# Patient Record
Sex: FEMALE | Race: BLACK OR AFRICAN AMERICAN | NOT HISPANIC OR LATINO
[De-identification: names, ages, dates, MRNs, and addresses within clinical notes are randomized per-mention and may not be internally consistent; named-entity substitution may affect disease eponyms.]

---

## 2021-02-22 PROBLEM — Z00.00 ENCOUNTER FOR PREVENTIVE HEALTH EXAMINATION: Status: ACTIVE | Noted: 2021-02-22

## 2021-02-23 ENCOUNTER — APPOINTMENT (OUTPATIENT)
Dept: OBGYN | Facility: CLINIC | Age: 26
End: 2021-02-23
Payer: COMMERCIAL

## 2021-02-23 VITALS
HEIGHT: 69 IN | DIASTOLIC BLOOD PRESSURE: 70 MMHG | TEMPERATURE: 98.2 F | HEART RATE: 67 BPM | BODY MASS INDEX: 24.44 KG/M2 | SYSTOLIC BLOOD PRESSURE: 131 MMHG | WEIGHT: 165 LBS

## 2021-02-23 DIAGNOSIS — Z78.9 OTHER SPECIFIED HEALTH STATUS: ICD-10-CM

## 2021-02-23 DIAGNOSIS — O36.80X1 PREGNANCY WITH INCONCLUSIVE FETAL VIABILITY, FETUS 1: ICD-10-CM

## 2021-02-23 DIAGNOSIS — Z83.49 FAMILY HISTORY OF OTHER ENDOCRINE, NUTRITIONAL AND METABOLIC DISEASES: ICD-10-CM

## 2021-02-23 DIAGNOSIS — Z82.49 FAMILY HISTORY OF ISCHEMIC HEART DISEASE AND OTHER DISEASES OF THE CIRCULATORY SYSTEM: ICD-10-CM

## 2021-02-23 PROCEDURE — 99202 OFFICE O/P NEW SF 15 MIN: CPT

## 2021-02-23 PROCEDURE — 99072 ADDL SUPL MATRL&STAF TM PHE: CPT

## 2021-02-24 ENCOUNTER — NON-APPOINTMENT (OUTPATIENT)
Age: 26
End: 2021-02-24

## 2021-02-24 ENCOUNTER — EMERGENCY (EMERGENCY)
Facility: HOSPITAL | Age: 26
LOS: 1 days | Discharge: HOME | End: 2021-02-24
Admitting: EMERGENCY MEDICINE
Payer: COMMERCIAL

## 2021-02-24 VITALS
HEART RATE: 69 BPM | SYSTOLIC BLOOD PRESSURE: 135 MMHG | DIASTOLIC BLOOD PRESSURE: 76 MMHG | TEMPERATURE: 98 F | OXYGEN SATURATION: 100 % | RESPIRATION RATE: 16 BRPM

## 2021-02-24 VITALS
HEART RATE: 64 BPM | RESPIRATION RATE: 18 BRPM | DIASTOLIC BLOOD PRESSURE: 80 MMHG | SYSTOLIC BLOOD PRESSURE: 123 MMHG | OXYGEN SATURATION: 100 % | TEMPERATURE: 97 F

## 2021-02-24 LAB
ALBUMIN SERPL ELPH-MCNC: 4.5 G/DL — SIGNIFICANT CHANGE UP (ref 3.3–5)
ALP SERPL-CCNC: 40 U/L — SIGNIFICANT CHANGE UP (ref 40–120)
ALT FLD-CCNC: 16 U/L — SIGNIFICANT CHANGE UP (ref 4–33)
ANION GAP SERPL CALC-SCNC: 13 MMOL/L — SIGNIFICANT CHANGE UP (ref 7–14)
APPEARANCE UR: CLEAR — SIGNIFICANT CHANGE UP
AST SERPL-CCNC: 16 U/L — SIGNIFICANT CHANGE UP (ref 4–32)
BASOPHILS # BLD AUTO: 0.06 K/UL — SIGNIFICANT CHANGE UP (ref 0–0.2)
BASOPHILS NFR BLD AUTO: 1 % — SIGNIFICANT CHANGE UP (ref 0–2)
BILIRUB SERPL-MCNC: <0.2 MG/DL — SIGNIFICANT CHANGE UP (ref 0.2–1.2)
BILIRUB UR-MCNC: NEGATIVE — SIGNIFICANT CHANGE UP
BUN SERPL-MCNC: 12 MG/DL — SIGNIFICANT CHANGE UP (ref 7–23)
CALCIUM SERPL-MCNC: 9.9 MG/DL — SIGNIFICANT CHANGE UP (ref 8.4–10.5)
CHLORIDE SERPL-SCNC: 99 MMOL/L — SIGNIFICANT CHANGE UP (ref 98–107)
CO2 SERPL-SCNC: 24 MMOL/L — SIGNIFICANT CHANGE UP (ref 22–31)
COLOR SPEC: SIGNIFICANT CHANGE UP
CREAT SERPL-MCNC: 0.7 MG/DL — SIGNIFICANT CHANGE UP (ref 0.5–1.3)
DIFF PNL FLD: ABNORMAL
EOSINOPHIL # BLD AUTO: 0.04 K/UL — SIGNIFICANT CHANGE UP (ref 0–0.5)
EOSINOPHIL NFR BLD AUTO: 0.6 % — SIGNIFICANT CHANGE UP (ref 0–6)
GLUCOSE SERPL-MCNC: 76 MG/DL — SIGNIFICANT CHANGE UP (ref 70–99)
GLUCOSE UR QL: NEGATIVE — SIGNIFICANT CHANGE UP
HCT VFR BLD CALC: 38.8 % — SIGNIFICANT CHANGE UP (ref 34.5–45)
HGB BLD-MCNC: 11.9 G/DL — SIGNIFICANT CHANGE UP (ref 11.5–15.5)
IANC: 3.08 K/UL — SIGNIFICANT CHANGE UP (ref 1.5–8.5)
IMM GRANULOCYTES NFR BLD AUTO: 0.3 % — SIGNIFICANT CHANGE UP (ref 0–1.5)
INR BLD: 1.06 RATIO — SIGNIFICANT CHANGE UP (ref 0.88–1.16)
KETONES UR-MCNC: ABNORMAL
LEUKOCYTE ESTERASE UR-ACNC: NEGATIVE — SIGNIFICANT CHANGE UP
LYMPHOCYTES # BLD AUTO: 2.68 K/UL — SIGNIFICANT CHANGE UP (ref 1–3.3)
LYMPHOCYTES # BLD AUTO: 42.6 % — SIGNIFICANT CHANGE UP (ref 13–44)
MCHC RBC-ENTMCNC: 24.9 PG — LOW (ref 27–34)
MCHC RBC-ENTMCNC: 30.7 GM/DL — LOW (ref 32–36)
MCV RBC AUTO: 81.3 FL — SIGNIFICANT CHANGE UP (ref 80–100)
MONOCYTES # BLD AUTO: 0.41 K/UL — SIGNIFICANT CHANGE UP (ref 0–0.9)
MONOCYTES NFR BLD AUTO: 6.5 % — SIGNIFICANT CHANGE UP (ref 2–14)
NEUTROPHILS # BLD AUTO: 3.08 K/UL — SIGNIFICANT CHANGE UP (ref 1.8–7.4)
NEUTROPHILS NFR BLD AUTO: 49 % — SIGNIFICANT CHANGE UP (ref 43–77)
NITRITE UR-MCNC: NEGATIVE — SIGNIFICANT CHANGE UP
NRBC # BLD: 0 /100 WBCS — SIGNIFICANT CHANGE UP
NRBC # FLD: 0 K/UL — SIGNIFICANT CHANGE UP
PH UR: 6.5 — SIGNIFICANT CHANGE UP (ref 5–8)
PLATELET # BLD AUTO: 380 K/UL — SIGNIFICANT CHANGE UP (ref 150–400)
POTASSIUM SERPL-MCNC: 3.7 MMOL/L — SIGNIFICANT CHANGE UP (ref 3.5–5.3)
POTASSIUM SERPL-SCNC: 3.7 MMOL/L — SIGNIFICANT CHANGE UP (ref 3.5–5.3)
PROT SERPL-MCNC: 7.8 G/DL — SIGNIFICANT CHANGE UP (ref 6–8.3)
PROT UR-MCNC: ABNORMAL
PROTHROM AB SERPL-ACNC: 12.2 SEC — SIGNIFICANT CHANGE UP (ref 10.6–13.6)
RBC # BLD: 4.77 M/UL — SIGNIFICANT CHANGE UP (ref 3.8–5.2)
RBC # FLD: 16.4 % — HIGH (ref 10.3–14.5)
SODIUM SERPL-SCNC: 136 MMOL/L — SIGNIFICANT CHANGE UP (ref 135–145)
SP GR SPEC: 1.03 — HIGH (ref 1.01–1.02)
UROBILINOGEN FLD QL: SIGNIFICANT CHANGE UP
WBC # BLD: 6.29 K/UL — SIGNIFICANT CHANGE UP (ref 3.8–10.5)
WBC # FLD AUTO: 6.29 K/UL — SIGNIFICANT CHANGE UP (ref 3.8–10.5)

## 2021-02-24 PROCEDURE — 99244 OFF/OP CNSLTJ NEW/EST MOD 40: CPT | Mod: GC

## 2021-02-24 PROCEDURE — 76830 TRANSVAGINAL US NON-OB: CPT | Mod: 26

## 2021-02-24 PROCEDURE — 99285 EMERGENCY DEPT VISIT HI MDM: CPT

## 2021-02-24 NOTE — ED PROVIDER NOTE - PROGRESS NOTE DETAILS
MATY Leslie Addendum-----This patient was signed out to me by MATY Munoz; pt was pending labs/US/Gyn evaluation.  In the interim, pt objectively noted to be resting comfortably; no issues thus far; pt HD stable.  Labs/US reviewed and pt was evaluated by Ob/Gyn team; assessment: likely miscarriage, Gyn team discussed options with pt; pt stated preference to be discharged home, monitor symptoms, and f/u outpatient with her Ob/Gyn.  Ob/Gyn resident states pt is able to be discharged home.

## 2021-02-24 NOTE — ED PROVIDER NOTE - NSFOLLOWUPINSTRUCTIONS_ED_ALL_ED_FT
Follow up with your Ob/Gyn doctor in 2 - 3 days.  Notify your primary medical doctor of your ER visit; follow up accordingly.    If you need to find a doctor to follow up with, you may call the [facility] Patient Access Services helpline at [phone] to find names/contact #s for a practitioner (or specialist) to follow up with.    Bring your discharge papers / test results with you to your follow up appointment(s).    Rest / no strenuous activity.  Stay well hydrated.    ***Return to the Emergency Department immediately if you experience any new / worsening symptoms or have any problems / concerns.***

## 2021-02-24 NOTE — ED ADULT NURSE NOTE - OBJECTIVE STATEMENT
patient aaox3. ambulatory w/o assist. came in with vaginal bleeding. patient is 10 weeks pregnant. confirmed iup by her gyn. last visit yesterday 2/23 verified fluid around gestational sac. patient has been spotting and having light flow all day. changed 1 pad so far. hx of chlamydia. denies urinary sx, fevers, chills, abdominal pain. iv started to left ac 20g. labs drawn and sent. urine collected and sent. ousmane tom at bedside for vaginal exam. awaiting tvus. Will continue to monitor

## 2021-02-24 NOTE — ED PROVIDER NOTE - PATIENT PORTAL LINK FT
You can access the FollowMyHealth Patient Portal offered by [facility] by registering at the following website: [email or web address]. By joining ""’s FollowMyHealth portal, you will also be able to view your health information using other applications (apps) compatible with our system.

## 2021-02-24 NOTE — ED ADULT TRIAGE NOTE - CHIEF COMPLAINT QUOTE
Pt states that she has been having painless vaginal bleeding, was seen by OB yesterday and had +IUP  LMP 12/2/20

## 2021-02-24 NOTE — ED PROVIDER NOTE - CHPI ED SYMPTOMS NEG
No weakness, dizziness, or urinary frequency../no dysuria/no fever/no chills no dysuria/no fever/no chills

## 2021-02-24 NOTE — ED PROVIDER NOTE - CARE PLAN
Principal Discharge DX:	Vaginal bleeding in pregnancy, first trimester  Secondary Diagnosis:	Miscarriage

## 2021-02-24 NOTE — CONSULT NOTE ADULT - SUBJECTIVE AND OBJECTIVE BOX
NIMA BRYAN  25y  Female [id number]    HPI:        Name of GYN Physician:     POB:      Pgyn: Denies fibroids, cysts, endometriosis, STI's, Abnormal pap smears     Home meds:     Hospital Meds:   MEDICATIONS  (STANDING):    MEDICATIONS  (PRN):      Allergies    No Known Allergies    Intolerances        PAST MEDICAL & SURGICAL HISTORY:  Syncope  past episode of syncope 4 yrs ago    No significant past surgical history        FAMILY HISTORY:      Social History:  Denies smoking use, drug use, alcohol use.   +occasional social alcohol use    Vital Signs Last 24 Hrs  T(C): 36.1 (24 Feb 2021 21:07), Max: 36.9 (24 Feb 2021 19:12)  T(F): 96.9 (24 Feb 2021 21:07), Max: 98.5 (24 Feb 2021 19:12)  HR: 64 (24 Feb 2021 21:07) (64 - 75)  BP: 123/80 (24 Feb 2021 21:07) (119/71 - 135/76)  BP(mean): --  RR: 18 (24 Feb 2021 21:07) (16 - 18)  SpO2: 100% (24 Feb 2021 21:07) (100% - 100%)    Physical Exam:   General: sitting comftorably in bed, NAD   HEENT: neck supple, full ROM  CV: RR S1S2 no m/r/g  Lungs: CTA b/l, good air flow b/l   Back: No CVA tenderness  Abd: Soft, non-tender, non-distended.  Bowel sounds present.    :  No bleeding on pad.    External labia wnl.  Bimanual exam with no cervical motion tenderness, uterus wnl, adnexa non palpable b/l.  Cervix closed vs. Cervix dilated    cm   Speculum Exam: No active bleeding from os.  Physiologic discharge.    Ext: non-tender b/l, no edema     LABS:                              11.9   6.29  )-----------( 380      ( 24 Feb 2021 23:22 )             38.8           I&O's Detail    PT/INR - ( 24 Feb 2021 23:22 )   PT: 12.2 sec;   INR: 1.06 ratio               RADIOLOGY & ADDITIONAL STUDIES: NIMA BRYAN  25y  Female [id number]    HPI: 26yo  LMP 21 at 12w1d presenting for vaginal bleeding at 3:30pm today.  States went through one maxi pad, but bleeding has since stopped.  Denies any cramping, any abd/pelvic pain.    Denies fevers, chills, nausea, vomiting, SOB, CP, urinary symptoms, cough, sick contacts.    Unplanned but desired pregnancy.    PNC: pt states was initially seeing OB in [place], had an ultrasound at [place] [facility] that showed a gestational sac, yolk sac, IUP but no FHR.  Transfered OB care to Dr. Hobbs yesterday, had US that again showed IUP but no FHR.  Was told gestational sac looked like it was collapsing.    OBHx: current    GynHx: denies fibroids, cysts, abnl pap smears, STIs, hx of irregular, heavy menses    PMSH: denies    Social History:  Denies smoking use, drug use, alcohol use.   +occasional social alcohol use    Meds: denies    Allergies: NKDA    Vital Signs Last 24 Hrs  T(C): 36.1 (2021 21:07), Max: 36.9 (2021 19:12)  T(F): 96.9 (2021 21:07), Max: 98.5 (2021 19:12)  HR: 64 (2021 21:07) (64 - 75)  BP: 123/80 (2021 21:07) (119/71 - 135/76)  RR: 18 (2021 21:07) (16 - 18)  SpO2: 100% (2021 21:07) (100% - 100%)    Physical Exam:   General: NAD, pt appears tearful  HEENT: neck supple, full ROM  CV: RR S1S2 no m/r/g  Lungs: CTA b/l, good air flow b/l   Back: No CVA tenderness  Abd: Soft, non-tender, non-distended.  Bowel sounds present.    :  No bleeding on pad.    External labia wnl.  Bimanual exam with no cervical motion tenderness, uterus wnl, adnexa non palpable b/l.  Cervix external os open to 0.5cm.  Speculum Exam: No active bleeding from os.  scant dark blood in vaginal walls, mucous from cervix, cervix appears closed  Ext: non-tender b/l, no edema     LABS:                              11.9   6.29  )-----------( 380      ( 2021 23:22 )             38.8           I&O's Detail    PT/INR - ( 2021 23:22 )   PT: 12.2 sec;   INR: 1.06 ratio       HCG Quantitative, Serum: 11593.0: For pregnancy evaluation the reference values are as follows:  Negative: <5 mIU/mL  Indeterminate: 5-25 mIU/mL (suggest repeat testing in 72hrs)  Positive: >25 mIU/mL  Note: hCG results of false positive and false negative for pregnancy are  rare, but can occur with this, and other hCG tests. Rhona- and  post-menopausal females may have mildly elevated hCG concentrations,  usually less than 14 mIU/mL, that are constant over time.  Weeks of pregnancy:           mIU/mL  ------------------         -------          3                                6 -      71          4                              10 -     750          5                             220 -   7,100          6                             160 -  32,000          7                3,700 - 164,000          8                          32,000 - 150,000          9                          64,000 - 151,000         10                         47,000 - 187,000         12                         28,000 - 211,000         14                         14,000 -  63,000         15                         12,000 -  71,000         16 - 18                   8,000 -  58,000 mIU/mL (21 @ 23:22)        RADIOLOGY & ADDITIONAL STUDIES:  < from: US Transvaginal (21 @ 23:38) >    EXAM:  US TRANSVAGINAL        PROCEDURE DATE:  2021         INTERPRETATION:  CLINICAL INFORMATION: Vaginal bleeding. Pregnant.    LMP: 2021    COMPARISON: None available.    TECHNIQUE:  Endovaginal pelvic sonogram only. Color and Spectral Doppler was performed.    FINDINGS:    Uterus: 8.8 cm x 5.4 cm x 7.6 cm. Trace fluid in the cervix.  Endometrium: Thickened and heterogeneous cystic endometrium measuring 2.0 cm with internal vascularity.    Right ovary: 3.5 cm x 1.7 cm x 3.0 cm. Corpus luteum noted. Normal arterial waveform.  Left ovary: 3.2 cm x 1.2 cm x 2.8 cm. Within normal limits. Arterial waveforms noted.    Fluid: None.    IMPRESSION:  Positive beta hCG without intrauterine gestational sac or suspicious adnexal mass, consistent with a pregnancy of unknown location. Differential diagnosis includes early intrauterine pregnancy, ectopic pregnancy and failed pregnancy. Close follow-up with ultrasound and beta hCG levels is necessary to exclude ectopic pregnancy.    The findings were discussed with MATY MCDONOUGH on 2021 1:32 AM.  Hospital policies for call back including read back policies were followed. The verbal communication call back supplements this written report.            SABRINA GUTIERREZ MD; Resident Radiology  This document has been electronically signed.  BAUTISTA FERRO MD; Attending Radiologist  This document has been electronically signed. 2021  1:32AM    < end of copied text >

## 2021-02-24 NOTE — ED PROVIDER NOTE - CLINICAL SUMMARY MEDICAL DECISION MAKING FREE TEXT BOX
26 y/o female patient well appearing and in NAD. Will check labs, U/S, and f/u GYN. 26 y/o female with no significant PMHx currently 10 weeks pregnant presents to the ER c/o 1 day of vaginal spotting. Pt states she noticed bleeding after intercourse today.  Pt is well appearing, NAD, will check labs, U/S, and f/u GYN.

## 2021-02-24 NOTE — ED PROVIDER NOTE - OBJECTIVE STATEMENT
26 y/o G 1/ P 0 female currently x 10 weeks pregnant LMP 12/2 with no pertinent PMHx presents to the ED with c/o x 1 day of vaginal spotting. Pt states she noticed bleeding after intercourse. Pt notes she is followed OB/GYN Dr. Hobbs; saw her GYN yesterday and has not seen a heart beat. Pt denies any weakness, dizziness, pain, fever, chills, dysuria, or urinary frequency. 24 y/o female with no significant PMHx currently 10 weeks pregnant presents to the ER c/o 1 day of vaginal spotting. Pt states she noticed bleeding after intercourse today. Pt is followed by OB/GYN Dr. Hobbs; pt saw her GYN yesterday and had a US. Pt denies weakness, dizziness, pain, fever, chills, dysuria, or urinary frequency.  , LMP 12/2.

## 2021-02-24 NOTE — CONSULT NOTE ADULT - ASSESSMENT
A/P: 24yo  at 12w1d by LMP presenting w vaginal bleeding, w spontaneous AB  Pt counseled about diagnosis of SAB.  No pregnancy identified intrauterine.  Pt clinically stable, w minimal bleeding noted, non tender on exam.  Pt given options including expectant mgmt, medical mgmt, and surgical mgmt.  Pt counseled on options of all three, potential need for surgical mgmt if bleeding becomes significant, or signs of infection, and potential complications.  R/b/a discussed.  Pt verbalizes understanding, all questions answered to pt apparent satisfaction.  Pt states she would like to go home, declines med and surg mgmt at this time.    Pt will have f/u w Dr. Hobbs for TVUS and office visit on Mon/Tu.  Pt given return precautions for bleeding, pain, infection.    RH pos.    D/w Dr. Mack Cruz PGY4

## 2021-02-25 LAB
BLD GP AB SCN SERPL QL: NEGATIVE — SIGNIFICANT CHANGE UP
CULTURE RESULTS: SIGNIFICANT CHANGE UP
HCG SERPL-ACNC: SIGNIFICANT CHANGE UP MIU/ML
RH IG SCN BLD-IMP: POSITIVE — SIGNIFICANT CHANGE UP
SPECIMEN SOURCE: SIGNIFICANT CHANGE UP

## 2021-02-28 PROBLEM — Z78.9 NO PERTINENT PAST SURGICAL HISTORY: Status: RESOLVED | Noted: 2021-02-28 | Resolved: 2021-02-28

## 2021-02-28 PROBLEM — Z78.9 DOES NOT USE ILLICIT DRUGS: Status: ACTIVE | Noted: 2021-02-28

## 2021-02-28 PROBLEM — Z78.9 NEVER SMOKED TOBACCO: Status: ACTIVE | Noted: 2021-02-28

## 2021-02-28 PROBLEM — Z82.49 FAMILY HISTORY OF ESSENTIAL HYPERTENSION: Status: ACTIVE | Noted: 2021-02-28

## 2021-02-28 PROBLEM — Z78.9 DENIES ALCOHOL CONSUMPTION: Status: ACTIVE | Noted: 2021-02-28

## 2021-02-28 PROBLEM — Z83.49 FAMILY HISTORY OF GRAVES' DISEASE: Status: ACTIVE | Noted: 2021-02-28

## 2021-02-28 LAB
ABO + RH PNL BLD: NORMAL
BACTERIA UR CULT: NORMAL
BLD GP AB SCN SERPL QL: NORMAL
C TRACH RRNA SPEC QL NAA+PROBE: NOT DETECTED
HBV SURFACE AG SER QL: NONREACTIVE
HCV AB SER QL: NONREACTIVE
HCV S/CO RATIO: 0.12 S/CO
HIV1+2 AB SPEC QL IA.RAPID: NONREACTIVE
N GONORRHOEA RRNA SPEC QL NAA+PROBE: NOT DETECTED
PROGEST SERPL-MCNC: 7.2 NG/ML
SOURCE AMPLIFICATION: NORMAL
T PALLIDUM AB SER QL IA: NEGATIVE

## 2021-02-28 RX ORDER — PRENATAL VIT 49/IRON FUM/FOLIC 6.75-0.2MG
TABLET ORAL
Refills: 0 | Status: ACTIVE | COMMUNITY

## 2021-03-02 ENCOUNTER — ASOB RESULT (OUTPATIENT)
Age: 26
End: 2021-03-02

## 2021-03-02 ENCOUNTER — APPOINTMENT (OUTPATIENT)
Dept: OBGYN | Facility: CLINIC | Age: 26
End: 2021-03-02
Payer: COMMERCIAL

## 2021-03-02 PROCEDURE — 99072 ADDL SUPL MATRL&STAF TM PHE: CPT

## 2021-03-02 PROCEDURE — 76817 TRANSVAGINAL US OBSTETRIC: CPT

## 2021-03-08 ENCOUNTER — EMERGENCY (EMERGENCY)
Facility: HOSPITAL | Age: 26
LOS: 1 days | Discharge: HOME | End: 2021-03-08
Attending: EMERGENCY MEDICINE | Admitting: EMERGENCY MEDICINE
Payer: COMMERCIAL

## 2021-03-08 VITALS
OXYGEN SATURATION: 99 % | TEMPERATURE: 98 F | RESPIRATION RATE: 17 BRPM | HEART RATE: 80 BPM | SYSTOLIC BLOOD PRESSURE: 121 MMHG | DIASTOLIC BLOOD PRESSURE: 60 MMHG

## 2021-03-08 PROCEDURE — 99285 EMERGENCY DEPT VISIT HI MDM: CPT

## 2021-03-08 NOTE — ED ADULT TRIAGE NOTE - CHIEF COMPLAINT QUOTE
Pt had a miscarriage 2 weeks, pt was 10 weeks pregnant. Pt reports heavy bleeding beginning around 5pm today, went through 4 pads. Pt also c/o pelvic cramping, nausea, vomiting, weakness, dizziness all beginning today. denies any PMH.

## 2021-03-09 VITALS
HEART RATE: 83 BPM | OXYGEN SATURATION: 99 % | RESPIRATION RATE: 16 BRPM | SYSTOLIC BLOOD PRESSURE: 122 MMHG | TEMPERATURE: 98 F | DIASTOLIC BLOOD PRESSURE: 72 MMHG

## 2021-03-09 LAB
ALBUMIN SERPL ELPH-MCNC: 4.2 G/DL — SIGNIFICANT CHANGE UP (ref 3.3–5)
ALP SERPL-CCNC: 37 U/L — LOW (ref 40–120)
ALT FLD-CCNC: 15 U/L — SIGNIFICANT CHANGE UP (ref 4–33)
ANION GAP SERPL CALC-SCNC: 10 MMOL/L — SIGNIFICANT CHANGE UP (ref 7–14)
APPEARANCE UR: CLEAR — SIGNIFICANT CHANGE UP
AST SERPL-CCNC: 15 U/L — SIGNIFICANT CHANGE UP (ref 4–32)
BACTERIA # UR AUTO: ABNORMAL
BILIRUB SERPL-MCNC: <0.2 MG/DL — SIGNIFICANT CHANGE UP (ref 0.2–1.2)
BILIRUB UR-MCNC: NEGATIVE — SIGNIFICANT CHANGE UP
BLD GP AB SCN SERPL QL: NEGATIVE — SIGNIFICANT CHANGE UP
BUN SERPL-MCNC: 14 MG/DL — SIGNIFICANT CHANGE UP (ref 7–23)
CALCIUM SERPL-MCNC: 9.6 MG/DL — SIGNIFICANT CHANGE UP (ref 8.4–10.5)
CHLORIDE SERPL-SCNC: 101 MMOL/L — SIGNIFICANT CHANGE UP (ref 98–107)
CO2 SERPL-SCNC: 24 MMOL/L — SIGNIFICANT CHANGE UP (ref 22–31)
COLOR SPEC: YELLOW — SIGNIFICANT CHANGE UP
CREAT SERPL-MCNC: 0.7 MG/DL — SIGNIFICANT CHANGE UP (ref 0.5–1.3)
DIFF PNL FLD: ABNORMAL
EPI CELLS # UR: 2 /HPF — SIGNIFICANT CHANGE UP (ref 0–5)
GLUCOSE SERPL-MCNC: 123 MG/DL — HIGH (ref 70–99)
GLUCOSE UR QL: NEGATIVE — SIGNIFICANT CHANGE UP
HCG SERPL-ACNC: 8789 MIU/ML — SIGNIFICANT CHANGE UP
HCT VFR BLD CALC: 33.7 % — LOW (ref 34.5–45)
HGB BLD-MCNC: 10.4 G/DL — LOW (ref 11.5–15.5)
HYALINE CASTS # UR AUTO: 0 /LPF — SIGNIFICANT CHANGE UP (ref 0–7)
KETONES UR-MCNC: ABNORMAL
LEUKOCYTE ESTERASE UR-ACNC: NEGATIVE — SIGNIFICANT CHANGE UP
MCHC RBC-ENTMCNC: 25.4 PG — LOW (ref 27–34)
MCHC RBC-ENTMCNC: 30.9 GM/DL — LOW (ref 32–36)
MCV RBC AUTO: 82.2 FL — SIGNIFICANT CHANGE UP (ref 80–100)
NITRITE UR-MCNC: NEGATIVE — SIGNIFICANT CHANGE UP
NRBC # BLD: 0 /100 WBCS — SIGNIFICANT CHANGE UP
NRBC # FLD: 0 K/UL — SIGNIFICANT CHANGE UP
PH UR: 6 — SIGNIFICANT CHANGE UP (ref 5–8)
PLATELET # BLD AUTO: 343 K/UL — SIGNIFICANT CHANGE UP (ref 150–400)
POTASSIUM SERPL-MCNC: 3.9 MMOL/L — SIGNIFICANT CHANGE UP (ref 3.5–5.3)
POTASSIUM SERPL-SCNC: 3.9 MMOL/L — SIGNIFICANT CHANGE UP (ref 3.5–5.3)
PROT SERPL-MCNC: 7.2 G/DL — SIGNIFICANT CHANGE UP (ref 6–8.3)
PROT UR-MCNC: ABNORMAL
RBC # BLD: 4.1 M/UL — SIGNIFICANT CHANGE UP (ref 3.8–5.2)
RBC # FLD: 15.9 % — HIGH (ref 10.3–14.5)
RBC CASTS # UR COMP ASSIST: 46 /HPF — HIGH (ref 0–4)
RH IG SCN BLD-IMP: POSITIVE — SIGNIFICANT CHANGE UP
SODIUM SERPL-SCNC: 135 MMOL/L — SIGNIFICANT CHANGE UP (ref 135–145)
SP GR SPEC: 1.03 — HIGH (ref 1.01–1.02)
UROBILINOGEN FLD QL: SIGNIFICANT CHANGE UP
WBC # BLD: 11.09 K/UL — HIGH (ref 3.8–10.5)
WBC # FLD AUTO: 11.09 K/UL — HIGH (ref 3.8–10.5)
WBC UR QL: 3 /HPF — SIGNIFICANT CHANGE UP (ref 0–5)

## 2021-03-09 PROCEDURE — 76830 TRANSVAGINAL US NON-OB: CPT | Mod: 26

## 2021-03-09 NOTE — ED PROVIDER NOTE - NSFOLLOWUPINSTRUCTIONS_ED_ALL_ED_FT
Your diagnosis: retained products of conception    We performed labs and an ultrasound of your pelvis showing retained products of conception. You were seen by our OB/GYN team who cleared you for discharge. You were given 1 dose of a medication called cytotec here in the ED.    Discharge instructions:    - Please follow up with your OB/GYN doctor in 1 week.    - Tylenol up to 650 mg every 8 hours as needed for pain and/or Motrin up to 600 mg every 6 hours as needed for pain.    - Be sure to return to the ED if you develop new or worsening symptoms. Specific signs and symptoms to be vigilant of: worsening or persistent vaginal bleeding, excessive blood loss, symptoms of anemia (dizziness, shortness of breath, decreased exercise tolerance), severe abdominal or pelvic pain.

## 2021-03-09 NOTE — ED PROVIDER NOTE - OBJECTIVE STATEMENT
26 y/o female G 1/P 0 approximately x 10 weeks pregnant with no pertinent PMHx with a miscarriage x 2 weeks ago. Pt followed up x 1 week later and was told “a lot of fluid still in uterus” and hormone level was still high; Pt's GYN Dr. Hobbs. Pt notes vaginal bleeding had stopped, but returned today at 5 pm; x 1 pad a hour. Pt endorses abdominal cramping that radiates to the back, and lightheadedness and weakness that started after the bleeding. Pt denies any fever, chills, or N/V/D. NKDA

## 2021-03-09 NOTE — ED PROVIDER NOTE - PROGRESS NOTE DETAILS
Juan: Gyn consulted for U/S suggestive of retained products. Ton mg cytotec buccally recommended by GYN, who cleared the patient for discharge with outpatient follow up with her gynecologist in 1 week. Safe for discharge.

## 2021-03-09 NOTE — CONSULT NOTE ADULT - SUBJECTIVE AND OBJECTIVE BOX
NIMA BRYAN  25y  Female [id number]    HPI:26 yo , LMP 12/, presenting with vaginal bleeding. Pt has a known miscarriage which occured in february. She presented to ED at that time and was diagnosed with SAB and discharged w/ outpatient follow-up. Pt states bleeding improved however last night around 5pm she soaked through 5 pads in 5 hours with associated dizziness/lightheadedness. She currently reports symptoms have improved. Reports bleeding is minimal and she is asymptomatic. Patient denies chest pain, shortness of breath, fevers, chills, nausea, vomiting, diarrhea.       Ob/Gyn Physician: [facility] (Dr. Hobbs)    Obhx: current  GynHx: Denies fibroids, cysts, abnormal pap smears, STIs  PMH: denies  PSH: denies  Allergies: NKDA    Vital Signs Last 24 Hrs  T(C): 36.9 (09 Mar 2021 03:23), Max: 36.9 (09 Mar 2021 03:23)  T(F): 98.5 (09 Mar 2021 03:23), Max: 98.5 (09 Mar 2021 03:23)  HR: 83 (09 Mar 2021 03:23) (80 - 83)  BP: 122/72 (09 Mar 2021 03:23) (121/60 - 122/72)  BP(mean): --  RR: 16 (09 Mar 2021 03:23) (16 - 17)  SpO2: 99% (09 Mar 2021 03:23) (99% - 99%)    Physical Exam:   General: NAD   CV: RR   Lungs: unlabored on RA  Abd: Soft, non-tender, non-distended  :  minimal bleeding on pad.  External labia wnl.   Speculum Exam: No active bleeding from os. cervix appears dilated  Bimanual exam: -CMT, uterus appropriately sized, nontender, adnexa non-enlarged, cervix dilated 1cm  Ext: non-tender b/l, no edema     LABS:                              10.4   11.09 )-----------( 343      ( 08 Mar 2021 23:59 )             33.7     03-08    135  |  101  |  14  ----------------------------<  123<H>  3.9   |  24  |  0.70    Ca    9.6      08 Mar 2021 23:59    TPro  7.2  /  Alb  4.2  /  TBili  <0.2  /  DBili  x   /  AST  15  /  ALT  15  /  AlkPhos  37<L>  -    I&O's Detail          RADIOLOGY & ADDITIONAL STUDIES:    < from: US Transvaginal (21 @ 02:23) >    EXAM:  US TRANSVAGINAL        PROCEDURE DATE:  Mar  9 2021         INTERPRETATION:  CLINICAL INFORMATION: Evaluate for retained products. Patient status post  2 weeks ago. Resenting with heavy bleeding.    LMP: 2020    COMPARISON: Pelvic ultrasound 2021    TECHNIQUE: Endovaginal pelvic sonogram only. Color and Spectral Doppler was performed.    FINDINGS:    Uterus: 10.1 cm x 5.9 cm x 6.2 cm. Within normal limits. The cervix is closed.  Endometrium: The endometrium is heterogeneously thickened at the lower uterine segment measuring up to 1.7 cm. There is trace vascularity within this area of thickening.    Right ovary: 3.5 cm x 1.5 cm x 1.5 cm. corpus luteal cyst measures 1.4 cm. Normal arterial and venous waveforms.  Leftovary: 3.2 cm x 2.4 cm x 1.7 cm. Within normal limits. Normal arterial and venous waveforms.    Fluid: None.    IMPRESSION:    Heterogeneous endometrial thickening in the lower uterine segment with trace associated vascularity is in keeping with retained products in the appropriate clinical setting. Correlate with beta hCG trend.                  BEATA ANDERSON MD; Resident Radiology  This document has been electronically signed.  LAILA TEIXEIRA MD; Attending Radiologist  This document has been electronically signed. Mar  9 2021  3:54AM    < end of copied text >

## 2021-03-09 NOTE — CONSULT NOTE ADULT - ASSESSMENT
24 yo , LMP 12/2, presenting with vaginal bleeding consistent with retained products of conception. Discussed management options with patient including expectant vs. medical vs. surgical. Pt desires medical management.    - please send Rx for Cytotec 800mg Buccally (please 2 pads between each upper cheek and gum and let dissolve)  - pt to schedule f/u with Ob/Gyn in 1 week  - return precautions explained    SILAS Howard, PGY-2  d/w Dr. Crane

## 2021-03-09 NOTE — ED PROVIDER NOTE - CLINICAL SUMMARY MEDICAL DECISION MAKING FREE TEXT BOX
26 y/o female s/p spontaneous  now with pain and bleeding. Concern for retained products. Will check labs, U/S, will discuss with GYN.

## 2021-03-09 NOTE — ED PROVIDER NOTE - ABDOMINAL EXAM
tender... O-L Flap Text: The defect edges were debeveled with a #15 scalpel blade.  Given the location of the defect, shape of the defect and the proximity to free margins an O-L flap was deemed most appropriate.  Using a sterile surgical marker, an appropriate advancement flap was drawn incorporating the defect and placing the expected incisions within the relaxed skin tension lines where possible.    The area thus outlined was incised deep to adipose tissue with a #15 scalpel blade.  The skin margins were undermined to an appropriate distance in all directions utilizing iris scissors.

## 2021-03-09 NOTE — ED PROVIDER NOTE - PATIENT PORTAL LINK FT
You can access the FollowMyHealth Patient Portal offered by [facility] by registering at the following website: [email or web address]. By joining [employer]’s FollowMyHealth portal, you will also be able to view your health information using other applications (apps) compatible with our system.

## 2021-05-14 ENCOUNTER — APPOINTMENT (OUTPATIENT)
Dept: OBGYN | Facility: CLINIC | Age: 26
End: 2021-05-14
Payer: COMMERCIAL

## 2021-05-14 PROCEDURE — 99213 OFFICE O/P EST LOW 20 MIN: CPT | Mod: 95

## 2021-05-14 RX ORDER — NAPROXEN SODIUM 550 MG/1
550 TABLET ORAL EVERY 8 HOURS
Qty: 24 | Refills: 3 | Status: ACTIVE | COMMUNITY
Start: 2021-05-14 | End: 1900-01-01

## 2021-12-06 ENCOUNTER — APPOINTMENT (OUTPATIENT)
Dept: OBGYN | Facility: CLINIC | Age: 26
End: 2021-12-06

## 2021-12-10 ENCOUNTER — APPOINTMENT (OUTPATIENT)
Dept: OBGYN | Facility: CLINIC | Age: 26
End: 2021-12-10
Payer: COMMERCIAL

## 2021-12-10 DIAGNOSIS — K59.00 CONSTIPATION, UNSPECIFIED: ICD-10-CM

## 2021-12-10 DIAGNOSIS — N92.6 IRREGULAR MENSTRUATION, UNSPECIFIED: ICD-10-CM

## 2021-12-10 DIAGNOSIS — Z30.41 ENCOUNTER FOR SURVEILLANCE OF CONTRACEPTIVE PILLS: ICD-10-CM

## 2021-12-10 DIAGNOSIS — K64.9 UNSPECIFIED HEMORRHOIDS: ICD-10-CM

## 2021-12-10 PROCEDURE — 99213 OFFICE O/P EST LOW 20 MIN: CPT | Mod: 95

## 2022-01-29 PROBLEM — N92.6 MENSTRUAL DISORDER: Status: ACTIVE | Noted: 2021-05-14

## 2022-01-29 PROBLEM — Z30.41 ENCOUNTER FOR SURVEILLANCE OF CONTRACEPTIVE PILLS: Status: RESOLVED | Noted: 2021-12-10 | Resolved: 2022-01-29

## 2022-08-09 ENCOUNTER — ASOB RESULT (OUTPATIENT)
Age: 27
End: 2022-08-09

## 2022-08-09 ENCOUNTER — APPOINTMENT (OUTPATIENT)
Dept: OBGYN | Facility: CLINIC | Age: 27
End: 2022-08-09

## 2022-08-09 VITALS
HEART RATE: 67 BPM | WEIGHT: 164 LBS | DIASTOLIC BLOOD PRESSURE: 80 MMHG | HEIGHT: 69 IN | BODY MASS INDEX: 24.29 KG/M2 | SYSTOLIC BLOOD PRESSURE: 123 MMHG

## 2022-08-09 DIAGNOSIS — Z83.3 FAMILY HISTORY OF DIABETES MELLITUS: ICD-10-CM

## 2022-08-09 DIAGNOSIS — Z32.01 ENCOUNTER FOR PREGNANCY TEST, RESULT POSITIVE: ICD-10-CM

## 2022-08-09 PROCEDURE — 76817 TRANSVAGINAL US OBSTETRIC: CPT

## 2022-08-09 PROCEDURE — 99214 OFFICE O/P EST MOD 30 MIN: CPT

## 2022-08-10 PROBLEM — Z83.3 FAMILY HISTORY OF DIABETES MELLITUS: Status: ACTIVE | Noted: 2022-08-10

## 2022-08-10 LAB
BASOPHILS # BLD AUTO: 0.04 K/UL
BASOPHILS NFR BLD AUTO: 0.7 %
EOSINOPHIL # BLD AUTO: 0.04 K/UL
EOSINOPHIL NFR BLD AUTO: 0.7 %
HBV SURFACE AG SER QL: NONREACTIVE
HCT VFR BLD CALC: 36.4 %
HCV AB SER QL: NONREACTIVE
HCV S/CO RATIO: 0.17 S/CO
HGB BLD-MCNC: 11.4 G/DL
HIV1+2 AB SPEC QL IA.RAPID: NONREACTIVE
IMM GRANULOCYTES NFR BLD AUTO: 0.4 %
LYMPHOCYTES # BLD AUTO: 1.93 K/UL
LYMPHOCYTES NFR BLD AUTO: 34.5 %
MAN DIFF?: NORMAL
MCHC RBC-ENTMCNC: 26.2 PG
MCHC RBC-ENTMCNC: 31.3 GM/DL
MCV RBC AUTO: 83.7 FL
MONOCYTES # BLD AUTO: 0.46 K/UL
MONOCYTES NFR BLD AUTO: 8.2 %
NEUTROPHILS # BLD AUTO: 3.1 K/UL
NEUTROPHILS NFR BLD AUTO: 55.5 %
PLATELET # BLD AUTO: 378 K/UL
RBC # BLD: 4.35 M/UL
RBC # FLD: 14.8 %
RUBV IGG FLD-ACNC: 7.7 INDEX
RUBV IGG SER-IMP: POSITIVE
T PALLIDUM AB SER QL IA: NEGATIVE
VZV AB TITR SER: POSITIVE
VZV IGG SER IF-ACNC: 277 INDEX
WBC # FLD AUTO: 5.59 K/UL

## 2022-08-15 LAB
ABO + RH PNL BLD: NORMAL
B19V IGG SER QL IA: 8.08 INDEX
B19V IGG+IGM SER-IMP: NORMAL
B19V IGG+IGM SER-IMP: POSITIVE
B19V IGM FLD-ACNC: 0.23 INDEX
B19V IGM SER-ACNC: NEGATIVE
BACTERIA UR CULT: NORMAL
BLD GP AB SCN SERPL QL: NORMAL
C TRACH RRNA SPEC QL NAA+PROBE: NOT DETECTED
CYTOLOGY CVX/VAG DOC THIN PREP: ABNORMAL
FMR1 GENE MUT ANL BLD/T: NORMAL
HGB A MFR BLD: 97.3 %
HGB A2 MFR BLD: 2.7 %
HGB FRACT BLD-IMP: NORMAL
LEAD BLD-MCNC: <1 UG/DL
N GONORRHOEA RRNA SPEC QL NAA+PROBE: NOT DETECTED
SOURCE AMPLIFICATION: NORMAL

## 2022-08-23 ENCOUNTER — TRANSCRIPTION ENCOUNTER (OUTPATIENT)
Age: 27
End: 2022-08-23

## 2022-09-13 LAB
AR GENE MUT ANL BLD/T: NORMAL
CFTR MUT TESTED BLD/T: NEGATIVE

## 2022-09-15 ENCOUNTER — APPOINTMENT (OUTPATIENT)
Dept: OBGYN | Facility: CLINIC | Age: 27
End: 2022-09-15

## 2022-09-15 ENCOUNTER — ASOB RESULT (OUTPATIENT)
Age: 27
End: 2022-09-15

## 2022-09-15 ENCOUNTER — APPOINTMENT (OUTPATIENT)
Dept: ANTEPARTUM | Facility: CLINIC | Age: 27
End: 2022-09-15

## 2022-09-15 VITALS
SYSTOLIC BLOOD PRESSURE: 125 MMHG | WEIGHT: 164 LBS | DIASTOLIC BLOOD PRESSURE: 75 MMHG | BODY MASS INDEX: 24.29 KG/M2 | HEIGHT: 69 IN | HEART RATE: 84 BPM

## 2022-09-15 DIAGNOSIS — Z34.01 ENCOUNTER FOR SUPERVISION OF NORMAL FIRST PREGNANCY, FIRST TRIMESTER: ICD-10-CM

## 2022-09-15 PROCEDURE — 76801 OB US < 14 WKS SINGLE FETUS: CPT | Mod: 59

## 2022-09-15 PROCEDURE — 76813 OB US NUCHAL MEAS 1 GEST: CPT

## 2022-09-15 PROCEDURE — 0500F INITIAL PRENATAL CARE VISIT: CPT

## 2022-10-13 ENCOUNTER — APPOINTMENT (OUTPATIENT)
Dept: OBGYN | Facility: CLINIC | Age: 27
End: 2022-10-13

## 2022-10-13 VITALS
WEIGHT: 168 LBS | HEIGHT: 69 IN | BODY MASS INDEX: 24.88 KG/M2 | HEART RATE: 76 BPM | SYSTOLIC BLOOD PRESSURE: 132 MMHG | DIASTOLIC BLOOD PRESSURE: 80 MMHG

## 2022-10-13 PROCEDURE — 0502F SUBSEQUENT PRENATAL CARE: CPT

## 2022-11-03 ENCOUNTER — APPOINTMENT (OUTPATIENT)
Dept: ANTEPARTUM | Facility: CLINIC | Age: 27
End: 2022-11-03
Payer: COMMERCIAL

## 2022-11-03 ENCOUNTER — ASOB RESULT (OUTPATIENT)
Age: 27
End: 2022-11-03

## 2022-11-03 PROCEDURE — 76811 OB US DETAILED SNGL FETUS: CPT | Mod: 59

## 2022-11-03 PROCEDURE — 99202 OFFICE O/P NEW SF 15 MIN: CPT | Mod: 25

## 2022-11-03 PROCEDURE — 99212 OFFICE O/P EST SF 10 MIN: CPT | Mod: 25

## 2022-11-03 PROCEDURE — 76817 TRANSVAGINAL US OBSTETRIC: CPT

## 2022-11-07 ENCOUNTER — APPOINTMENT (OUTPATIENT)
Dept: ANTEPARTUM | Facility: CLINIC | Age: 27
End: 2022-11-07

## 2022-11-07 ENCOUNTER — ASOB RESULT (OUTPATIENT)
Age: 27
End: 2022-11-07

## 2022-11-07 PROCEDURE — 99213 OFFICE O/P EST LOW 20 MIN: CPT

## 2022-11-07 PROCEDURE — 76816 OB US FOLLOW-UP PER FETUS: CPT

## 2022-11-07 PROCEDURE — 76817 TRANSVAGINAL US OBSTETRIC: CPT

## 2022-11-08 ENCOUNTER — OUTPATIENT (OUTPATIENT)
Dept: OUTPATIENT SERVICES | Facility: HOSPITAL | Age: 27
LOS: 1 days | End: 2022-11-08

## 2022-11-08 ENCOUNTER — TRANSCRIPTION ENCOUNTER (OUTPATIENT)
Age: 27
End: 2022-11-08

## 2022-11-08 VITALS
HEIGHT: 68.5 IN | TEMPERATURE: 97 F | RESPIRATION RATE: 16 BRPM | DIASTOLIC BLOOD PRESSURE: 69 MMHG | OXYGEN SATURATION: 99 % | HEART RATE: 90 BPM | WEIGHT: 171.96 LBS | SYSTOLIC BLOOD PRESSURE: 104 MMHG

## 2022-11-08 DIAGNOSIS — O26.872 CERVICAL SHORTENING, SECOND TRIMESTER: ICD-10-CM

## 2022-11-08 LAB
BLD GP AB SCN SERPL QL: NEGATIVE — SIGNIFICANT CHANGE UP
HCT VFR BLD CALC: 31 % — LOW (ref 34.5–45)
HGB BLD-MCNC: 9.7 G/DL — LOW (ref 11.5–15.5)
MCHC RBC-ENTMCNC: 26.6 PG — LOW (ref 27–34)
MCHC RBC-ENTMCNC: 31.3 GM/DL — LOW (ref 32–36)
MCV RBC AUTO: 84.9 FL — SIGNIFICANT CHANGE UP (ref 80–100)
NRBC # BLD: 0 /100 WBCS — SIGNIFICANT CHANGE UP (ref 0–0)
NRBC # FLD: 0 K/UL — SIGNIFICANT CHANGE UP (ref 0–0)
PLATELET # BLD AUTO: 319 K/UL — SIGNIFICANT CHANGE UP (ref 150–400)
RBC # BLD: 3.65 M/UL — LOW (ref 3.8–5.2)
RBC # FLD: 14 % — SIGNIFICANT CHANGE UP (ref 10.3–14.5)
RH IG SCN BLD-IMP: POSITIVE — SIGNIFICANT CHANGE UP
WBC # BLD: 8.39 K/UL — SIGNIFICANT CHANGE UP (ref 3.8–10.5)
WBC # FLD AUTO: 8.39 K/UL — SIGNIFICANT CHANGE UP (ref 3.8–10.5)

## 2022-11-08 RX ORDER — PROGESTERONE 200 MG/1
1 CAPSULE, LIQUID FILLED ORAL
Qty: 0 | Refills: 0 | DISCHARGE

## 2022-11-08 NOTE — H&P PST ADULT - NSANTHOSAYNRD_GEN_A_CORE
No. OC screening performed.  STOP BANG Legend: 0-2 = LOW Risk; 3-4 = INTERMEDIATE Risk; 5-8 = HIGH Risk

## 2022-11-08 NOTE — H&P PST ADULT - PROBLEM SELECTOR PLAN 1
Patient tentatively scheduled for cerclage placement on 11/9/22.  Pre-op instructions provided. Pt given verbal and written instructions with teach back . Pt verbalized understanding with return demonstration.   Covid test done yesterday as per patient.

## 2022-11-08 NOTE — H&P PST ADULT - HAVE YOU HAD COVID IN THE LAST 60 DAYS?
Last office visit 9/10/19 for pre op.  Refill request is for zyrtec, last refilled #[phone] x 0 on 11/12/18.  Pharmacy and RX pending, please sign if approved.    
No

## 2022-11-08 NOTE — H&P PST ADULT - HISTORY OF PRESENT ILLNESS
27 year old female who is 20 week pregnant  with no PMH presents to Presurgical testing with diagnosis of  cervical shortening second trimester scheduled for cerclage placement.

## 2022-11-09 ENCOUNTER — TRANSCRIPTION ENCOUNTER (OUTPATIENT)
Age: 27
End: 2022-11-09

## 2022-11-09 ENCOUNTER — OUTPATIENT (OUTPATIENT)
Dept: INPATIENT UNIT | Facility: HOSPITAL | Age: 27
LOS: 1 days | End: 2022-11-09

## 2022-11-09 VITALS
HEIGHT: 68.5 IN | DIASTOLIC BLOOD PRESSURE: 72 MMHG | HEART RATE: 74 BPM | RESPIRATION RATE: 16 BRPM | TEMPERATURE: 98 F | WEIGHT: 171.96 LBS | SYSTOLIC BLOOD PRESSURE: 123 MMHG

## 2022-11-09 VITALS
HEART RATE: 87 BPM | OXYGEN SATURATION: 100 % | DIASTOLIC BLOOD PRESSURE: 81 MMHG | SYSTOLIC BLOOD PRESSURE: 103 MMHG | RESPIRATION RATE: 22 BRPM

## 2022-11-09 DIAGNOSIS — O26.872 CERVICAL SHORTENING, SECOND TRIMESTER: ICD-10-CM

## 2022-11-09 LAB
HCT VFR BLD CALC: 31.4 % — LOW (ref 34.5–45)
HGB BLD-MCNC: 10.1 G/DL — LOW (ref 11.5–15.5)
MCHC RBC-ENTMCNC: 26.9 PG — LOW (ref 27–34)
MCHC RBC-ENTMCNC: 32.2 GM/DL — SIGNIFICANT CHANGE UP (ref 32–36)
MCV RBC AUTO: 83.5 FL — SIGNIFICANT CHANGE UP (ref 80–100)
NRBC # BLD: 0 /100 WBCS — SIGNIFICANT CHANGE UP (ref 0–0)
NRBC # FLD: 0 K/UL — SIGNIFICANT CHANGE UP (ref 0–0)
PLATELET # BLD AUTO: 283 K/UL — SIGNIFICANT CHANGE UP (ref 150–400)
RBC # BLD: 3.76 M/UL — LOW (ref 3.8–5.2)
RBC # FLD: 14 % — SIGNIFICANT CHANGE UP (ref 10.3–14.5)
WBC # BLD: 8.8 K/UL — SIGNIFICANT CHANGE UP (ref 3.8–10.5)
WBC # FLD AUTO: 8.8 K/UL — SIGNIFICANT CHANGE UP (ref 3.8–10.5)

## 2022-11-09 RX ORDER — INDOMETHACIN 50 MG
1 CAPSULE ORAL
Qty: 8 | Refills: 0
Start: 2022-11-09 | End: 2022-11-10

## 2022-11-09 RX ORDER — SODIUM CHLORIDE 9 MG/ML
1000 INJECTION, SOLUTION INTRAVENOUS
Refills: 0 | Status: DISCONTINUED | OUTPATIENT
Start: 2022-11-09 | End: 2022-11-09

## 2022-11-09 RX ORDER — CITRIC ACID/SODIUM CITRATE 300-500 MG
30 SOLUTION, ORAL ORAL ONCE
Refills: 0 | Status: COMPLETED | OUTPATIENT
Start: 2022-11-09 | End: 2022-11-09

## 2022-11-09 RX ORDER — FAMOTIDINE 10 MG/ML
20 INJECTION INTRAVENOUS ONCE
Refills: 0 | Status: COMPLETED | OUTPATIENT
Start: 2022-11-09 | End: 2022-11-09

## 2022-11-09 RX ORDER — SODIUM CHLORIDE 9 MG/ML
1000 INJECTION, SOLUTION INTRAVENOUS ONCE
Refills: 0 | Status: COMPLETED | OUTPATIENT
Start: 2022-11-09 | End: 2022-11-09

## 2022-11-09 RX ORDER — PROGESTERONE 200 MG/1
1 CAPSULE, LIQUID FILLED ORAL
Qty: 0 | Refills: 0 | DISCHARGE

## 2022-11-09 RX ADMIN — SODIUM CHLORIDE 1000 MILLILITER(S): 9 INJECTION, SOLUTION INTRAVENOUS at 09:30

## 2022-11-09 RX ADMIN — Medication 30 MILLILITER(S): at 10:55

## 2022-11-09 RX ADMIN — FAMOTIDINE 20 MILLIGRAM(S): 10 INJECTION INTRAVENOUS at 10:55

## 2022-11-09 NOTE — OB RN PATIENT PROFILE - BREASTFEEDING PROVIDES STABLE TEMPERATURE THROUGH SKIN TO SKIN CONTACT
Subjective: Patient seen and examined. No new events except as noted.     REVIEW OF SYSTEMS:    CONSTITUTIONAL:+ weakness, fevers or chills  EYES/ENT: No visual changes;  No vertigo or throat pain   NECK: No pain or stiffness  RESPIRATORY: No cough, wheezing, hemoptysis; No shortness of breath  CARDIOVASCULAR: No chest pain or palpitations  GASTROINTESTINAL: No abdominal or epigastric pain. No nausea, vomiting, or hematemesis; No diarrhea or constipation. No melena or hematochezia.  GENITOURINARY: No dysuria, frequency or hematuria  NEUROLOGICAL:+ numbness or weakness  SKIN: No itching, burning, rashes, or lesions   All other review of systems is negative unless indicated above.    MEDICATIONS:  MEDICATIONS  (STANDING):  artificial tears (preservative free) Ophthalmic Solution 1 Drop(s) Both EYES daily  aspirin  chewable 81 milliGRAM(s) Oral daily  atorvastatin 80 milliGRAM(s) Oral at bedtime  clopidogrel Tablet 75 milliGRAM(s) Oral daily  enoxaparin Injectable 40 milliGRAM(s) SubCutaneous daily  midodrine. 10 milliGRAM(s) Oral three times a day  sodium chloride 0.9%. 1000 milliLiter(s) (25 mL/Hr) IV Continuous <Continuous>      PHYSICAL EXAM:  T(C): 36.6 (08-21-20 @ 08:04), Max: 37 (08-20-20 @ 16:37)  HR: 57 (08-21-20 @ 10:00) (52 - 65)  BP: 132/66 (08-21-20 @ 08:00) (130/66 - 163/67)  RR: 12 (08-21-20 @ 10:00) (12 - 23)  SpO2: 98% (08-21-20 @ 10:00) (95% - 98%)  Wt(kg): --  I&O's Summary    20 Aug 2020 07:01  -  21 Aug 2020 07:00  --------------------------------------------------------  IN: 1570 mL / OUT: 1400 mL / NET: 170 mL    21 Aug 2020 07:01  -  21 Aug 2020 10:57  --------------------------------------------------------  IN: 340 mL / OUT: 0 mL / NET: 340 mL        Appearance: NAD	  HEENT:   Dry  oral mucosa, PERRL, EOMI	  Lymphatic: No lymphadenopathy  Cardiovascular: Normal S1 S2, No JVD, No murmurs, No edema  Respiratory: Lungs clear to auscultation	  Psychiatry: A & O x 3, Mood & affect appropriate  Gastrointestinal:  Soft, Non-tender, + BS	  Skin: No rashes, No ecchymoses, No cyanosis	  Extremities: Normal range of motion, No clubbing, cyanosis or edema  Vascular: Peripheral pulses palpable 2+ bilaterally  - Cranial Nerves II-XII:  VFF, EOMI, PERRL (3mm OD, OS), V1-V3 intact, some nasolabial flattening on the left side of face, t/p midline, SCM/trap intact.  	- Fundoscopic examination: upon fundoscopic examination grossly clear margins of optic disc & cup  	- Motor: Pronator drift present on the left side. demonstrates orbiting around the left arm. Strength left arm 4+/5 with  strength 4/5. Left leg 4+/5 hip flexors/extensors with 5/5 knee flexion/ext dorsi/plantarflexion. right arm and leg 5/5. Normal muscle bulk and tone throughout. Neck flexion/extension 5/5 without neck stiffness  	- Sensory:  Intact to light touch and PP bilaterally throughout.  	- Coordination:  FTN demonstrated mild dysmetria in proportion to weakness on left arm, intact on right  - Gait: patient able to stand up on his own, ambulate several steps without wide based gait, not requiring assistance.    LABS:    CARDIAC MARKERS:                                13.6   8.67  )-----------( 257      ( 21 Aug 2020 05:02 )             41.3     08-21    136  |  106  |  12  ----------------------------<  103<H>  3.7   |  22  |  0.68    Ca    9.3      21 Aug 2020 05:02      proBNP:   Lipid Profile:   HgA1c:   TSH:             TELEMETRY: 	    ECG:  	  RADIOLOGY:   DIAGNOSTIC TESTING:  [ ] Echocardiogram:  [ ]  Catheterization:  [ ] Stress Test:    OTHER: Statement Selected

## 2022-11-09 NOTE — OB RN INTRAOPERATIVE NOTE - NSOBSELHIDDEN_OBGYN_ALL_OB_FT
[NSOBAttendingProcedure1_OBGYN_ALL_OB_FT:[id number]],[NSRNCirculatorProcedure1_OBGYN_ALL_OB_FT:[id number]=]

## 2022-11-09 NOTE — OB RN INTRAOPERATIVE NOTE - NS_ADDITIONALPROCINFO2_OBGYN_ALL_OB_FT
Pre-procedure HR 154bpm confirmed on US by MATY Campbell  Post-procedure HR 150bpm confirmed on US by MD Whelan

## 2022-11-09 NOTE — DISCHARGE NOTE ANTEPARTUM - CARE PLAN
1 Principal Discharge DX:	Mohan cerclage present, antepartum  Assessment and plan of treatment:	mohan cerclage placed

## 2022-11-09 NOTE — OB PROVIDER H&P - TIME BILLING
I saw and evaluated Ms. Sheldon. Agree with A/P as above.     Risks and benefits reviewed.   Consents signed and placed on chart.     Aylin Del Valle MD

## 2022-11-09 NOTE — OB PROVIDER H&P - ASSESSMENT
27 year old  @ 20.5 weeks, admitted to L&D for scheduled cerclage placement due to cervical insufficiency, +FHR on bedside sonogram, vital signs stable  - Admit to L&D  - NPO  - IV access, CBC  - Pepcid and Bicitra as per pre-op policy  - Anesthesia consult   - Consent to be discussed and obtained by Dr. Del Valle  - Plan to move to OR on time schedule  - Discussed with Dr. Del Valle

## 2022-11-09 NOTE — DISCHARGE NOTE ANTEPARTUM - NS MD DC FALL RISK RISK
For information on Fall & Injury Prevention, visit: [email or web address] OR  [email or web address] OR  https://www.cdc.gov/steadi/patient.html

## 2022-11-09 NOTE — OB RN INTRAOPERATIVE NOTE - NSVAGINALBETAPREP1_OBGYN_ALL_OB
ORTHOPAEDIC NOTE    Chief Complaint   Patient presents with   • Finger     RT MF trigger \" Finger locks sometimes \"           HISTORY OF PRESENT ILLNESS: Jean-Paul Linda is a 85 year old female presenting for problems with the right hand.  She describes approximately 6 months of catching and locking of the right middle finger.  This happens with flexion and extension of the digit.  It began spontaneously.  She denies being diabetic.  She describes sharp, episodic pain that occurs with activity.  When a become stuck she has to passively extended at times.  This seems to be worse in the morning time but now has gone on to be painful and has mechanical problems throughout the day as well.  No history of trauma.  She is right-hand dominant pain is ranked as up to 4/10.    Past Medical History:   Diagnosis Date   • Arthritis    • Essential (primary) hypertension    • H/O right knee surgery    • Personal history of traumatic fracture    • Urinary incontinence      Past Surgical History:   Procedure Laterality Date   • Appendectomy     • Coronary angioplasty with stent placement      three   • Knee surgery       Social History     Tobacco Use   • Smoking status: Former Smoker     Packs/day: 0.25     Years: 30.00     Pack years: 7.50     Types: Cigarettes     Last attempt to quit: 2011     Years since quittin.6   • Smokeless tobacco: Never Used   Substance Use Topics   • Alcohol use: No     Alcohol/week: 0.0 standard drinks   • Drug use: No     Current Outpatient Medications   Medication Sig   • lisinopril (ZESTRIL) 20 MG tablet Take 1 tablet by mouth daily.   • carvedilol (COREG) 6.25 MG tablet Take 1 tablet by mouth 2 times daily (with meals).   • clopidogrel (PLAVIX) 75 MG tablet Take 1 tablet by mouth daily.   • simvastatin (ZOCOR) 40 MG tablet Take 1 tablet by mouth every evening.   • vitamin - therapeutic multivitamins w/minerals (CENTRUM SILVER,THERA-M) TABS Take 1 tablet by mouth daily.   • aspirin 81  MG tablet Take 1 tablet by mouth daily.     No current facility-administered medications for this visit.      ALLERGIES:   Allergen Reactions   • Codeine DIZZINESS       REVIEW OF SYSTEMS:  Constitutional:  Denies fever or chills  Eyes:  Denies change in visual acuity  HEENT: Denies nasal congestion or sore throat  Respiratory:  Denies cough or shortness of breath  Cardiovascular:  Denies chest pain or edema  Gastrointestinal:  Denies abdominal pain, nausea, vomiting, bloody stools or diarrhea  Genitourinary:  Denies dysuria  Musculoskeletal:  Denies back pain or joint pain  Integument:  Denies rash  Neurologic:  Denies headache, focal weakness or sensory changes  Endocrine:  Denies polyuria or polydipsia  Lymphatic:  Denies swollen glands  Psychiatric:  Denies depression or anxiety    PHYSICAL EXAM:  There were no vitals taken for this visit.  General:  Well groomed, in no apparent distress.  HEENT:  Eyes normal, PERRLA, sinuses nontender, nose normal, pharynx clear.  Neck:  Supple, no lymphadenopathy, no thyromegaly.  Extremities:  No cyanosis, clubbing, or edema.  Skin:  No abnormal skin lesions.  Neurologic:  Alert and oriented x4. Alert and cooperative. Cranial nerves II-XII intact.  Reflexes 2+ and symmetrical throughout. Normal strength and sensation.  Musculoskeletal:  Nodule at the level of the A1 pulley of the right middle finger.  She displays catching and locking of the digit with flexion extension of the hand.  Digits are well perfused.  Sensations intact in the distribution of the radial, median and ulnar nerves.    X-RAY INTERPRETATION:  X-rays of the right hand are negative for fracture dislocation      IMPRESSION/DIAGNOSIS:  Trigger finger right middle finger      TREATMENT AND RECOMMENDATIONS:  We discussed treatment options and I recommended cortisone injection within the right middle finger.  This was done today in clinic.        Procedure: We obtained informed consent. Sterilely prepped the  patient's right hand palmarly. A timeout was taken to verify patient, site and procedure. Using sterile technique, I injected 40 mg of Depo-Medrol and 1 cc of lidocaine into the flexor tendon sheath of the right middle finger. The patient tolerated it well and a sterile bandage was applied.       Yes

## 2022-11-09 NOTE — OB RN PATIENT PROFILE - FUNCTIONAL ASSESSMENT - DAILY ACTIVITY ASSESSMENT TYPE
lesions. There were no rashes. (Papular, Maculopapular, Hives, Pustular, Macular)     There were no lesions (Ulcers, Erythema, Abn. Appearing Nevi)        Lymphatic:  No Lymph Nodes were Palpable in the neck , axilla or groin. Inguinal lymph nodes wnl   Neck and EENT:  The neck was supple. There were no masses       Respiratory: The lungs were auscultated and found to be clear. There were no rales, rhonchi or wheezes. There was a good respiratory effort. Cardiovascular: The heart was in a regular rate and rhythm. . No S3 or S4. There was no murmur appreciated. Location, grade, and radiation are not applicable. Extremities: The patients extremities were without calf tenderness, edema, or varicosities. There was full range of motion in all four extremities. Abdomen: The abdomen was soft and non-tender. There were good bowel sounds in all quadrants and there was no guarding, rebound or rigidity. On evaluation there was no evidence of hepatosplenomegaly and there was no costal vertebral caroline tenderness bilaterally. No hernias were appreciated. Abdominal Scars: none    Psych: The patient had a normal Orientation to: Time, Place, Person, and Situation  There is no Mood / Affect changes    Breast:  (Chest)  normal appearance, no masses or tenderness  Self breast exams were reviewed in detail. Literature was given. Pelvic Exam:  Vulva and vagina appear normal. Bimanual exam reveals normal uterus and adnexa. Rectal Exam:  def      Musculosk:  Normal Gait and station was noted. Digits were evaluated without abnormal findings. Range of motion, stability and strength were evaluated and found to be appropriate for the patients age. ASSESSMENT:      27 y.o. Annual  1. Well female exam with routine gynecological exam    2.  H/O menorrhagia         Chief Complaint   Patient presents with    Gynecologic Exam          Past Medical History:   Diagnosis Date    Mononucleosis 2013
Admission

## 2022-11-09 NOTE — OB RN PATIENT PROFILE - BMI (KG/M2)
25.8 Posterior Auricular Interpolation Flap Text: A decision was made to reconstruct the defect utilizing an interpolation axial flap and a staged reconstruction.  A telfa template was made of the defect.  This telfa template was then used to outline the posterior auricular interpolation flap.  The donor area for the pedicle flap was then injected with anesthesia.  The flap was excised through the skin and subcutaneous tissue down to the layer of the underlying musculature.  The pedicle flap was carefully excised within this deep plane to maintain its blood supply.  The edges of the donor site were undermined.   The donor site was closed in a primary fashion.  The pedicle was then rotated into position and sutured.  Once the tube was sutured into place, adequate blood supply was confirmed with blanching and refill.  The pedicle was then wrapped with xeroform gauze and dressed appropriately with a telfa and gauze bandage to ensure continued blood supply and protect the attached pedicle.

## 2022-11-09 NOTE — BRIEF OPERATIVE NOTE - NSICDXBRIEFPREOP_GEN_ALL_CORE_FT
PRE-OP DIAGNOSIS:  Cervical insufficiency in pregnancy, antepartum 09-Nov-2022 12:50:46  Chelsea Whelan  Short cervix affecting pregnancy 09-Nov-2022 12:51:00  Chelsea Whelan

## 2022-11-09 NOTE — DISCHARGE NOTE ANTEPARTUM - CARE PROVIDER_API CALL
Charline Hobbs (MD)  Obstetrics and Gynecology  [facility] [place] [address]  Phone: [phone]  Fax: [phone]  Established Patient  Scheduled Appointment: 11/17/2022

## 2022-11-09 NOTE — DISCHARGE NOTE ANTEPARTUM - PATIENT PORTAL LINK FT
You can access the FollowMyHealth Patient Portal offered by [facility] by registering at the following website: [email or web address]. By joining [facility]’s FollowMyHealth portal, you will also be able to view your health information using other applications (apps) compatible with our system.
General

## 2022-11-09 NOTE — DISCHARGE NOTE ANTEPARTUM - HOSPITAL COURSE
Patient admitted for scheduled cerclage. Mohan cerclage placed under regional anesthesia without issue. TVUS following procedure demonstrated cervical length of 2.89cm. Post-procedural  bpm with subjectively normal amniotic fluid and fetal movements. Patient met all post operative milestones and was discharged home in stable condition.

## 2022-11-09 NOTE — DISCHARGE NOTE ANTEPARTUM - ADDITIONAL INSTRUCTIONS
Nothing in the vagina (tampons, sexual intercourse), no tub baths, pools or hot tubs for with the cerclage in place (showers are ok!).       Vaginal bleeding    Spotting  is normal in first few days after surgery. If you are soaking 1 pad per hour, that is NOT normal and you should notify Dr. Hobbs's office and seek medical attention right away.    Signs of Infection    Call the office and/or come to the emergency room for any of the following: foul smelling vaginal discharge, fever over 100.4F, abdominal pain or cramping that does not get better with over the counter medications, nausea/vomiting (especially if you become unable to tolerate oral intake), inability to urinate. Any of these could be signs that you may be developing an infection requiring antibiotics.

## 2022-11-09 NOTE — BRIEF OPERATIVE NOTE - OPERATION/FINDINGS
Internal cervical os 1cm dilated prior to procedure start. Cervix very posterior  Mohan cerclage performed using single Ticron suture with knot tied at 12 o'clock   TVUS following procedure demonstrated cervical length of 2.89cm   Post-procedural  bpm with subjectively normal amniotic fluid and fetal movements Internal cervical os 1cm dilated prior to procedure start. Cervix very posterior  Mohan cerclage performed using single Ticron suture with knot tied at 12 o'clock   TVUS following procedure demonstrated cerclage stitch 2.89cm proximal to external cervical os   Post-procedural  bpm with subjectively normal amniotic fluid and fetal movements

## 2022-11-09 NOTE — OB RN PATIENT PROFILE - FALL HARM RISK - UNIVERSAL INTERVENTIONS
Bed in lowest position, wheels locked, appropriate side rails in place/Call bell, personal items and telephone in reach/Instruct patient to call for assistance before getting out of bed or chair/Non-slip footwear when patient is out of bed/[place] to call system/Physically safe environment - no spills, clutter or unnecessary equipment/Purposeful Proactive Rounding/Room/bathroom lighting operational, light cord in reach

## 2022-11-09 NOTE — DISCHARGE NOTE ANTEPARTUM - MEDICATION SUMMARY - MEDICATIONS TO TAKE
I will START or STAY ON the medications listed below when I get home from the hospital:    indomethacin 25 mg oral capsule  -- 1 cap(s) by mouth every 6 hours MDD:4 tablets per day  -- Do not take aspirin or aspirin containing products without knowledge and consent of your physician.  It is very important that you take or use this exactly as directed.  Do not skip doses or discontinue unless directed by your doctor.  May cause drowsiness or dizziness.  Obtain medical advice before taking any non-prescription drugs as some may affect the action of this medication.  Take with food or milk.    -- Indication: For Mohan cerclage present, antepartum    Prenatal Multivitamins  -- 1 tab(s) by mouth once a day  -- Indication: For vitamin

## 2022-11-09 NOTE — OB PROVIDER H&P - HISTORY OF PRESENT ILLNESS
27 year old  @ 20.5 weeks, CALVIN 3/24/23 dated by LMP (22) consistent with 1st Trimester US, presents to L&D for scheduled cerclage placement due to incidental finding of cervical shortening on anatomy scan, recent CL 0.62 cm with funneling noted on . NPO TIME 9pm last night. Patient states she has not felt fetal movements yet. Denies vaginal bleeding, leakage of fluid, painful contractions/lower abdominal cramping, fever/chills, shortness of breath,  chest pain, increased swelling, difficulty ambulating, loss of taste/smell, nausea/vomiting/diarrhea, rash, weakness, paresthesia, change in appetite, dizziness, lightheadedness, cough, nasal congestion, runny nose    OB History: : SAB, medical,   G2: Current pregnancy, complicated by cervical shortening     GYN Hx: .gynhx    Med Hx: Denies asthma, HTN, DM, CAD, or other medical issues    Meds: PNV, denies other medications including prescribed/OTC     Allergies: NKDA, NKEA, NKFA    Surgical Hx: **    Social: Denies cigarette/tobacco/alcohol/illicit drug use    Psych: Denies axiety/depression/other mental health disease    Physical Exam:  Vitals:  Gen: NAD, A+O x 3, resting comfortably  Resp: CTAB  Cardio: RRR  Abd: Gravid, soft, non-distended, non-tender to superficial and deep palpation in all 4 quadrants, no rebound/guarding  Ext: Warm, well perfused, 1+ nonpitting edema bilaterally    EFM: **  [place]: **    Bedside Transabdominal US: **   27 year old  @ 20.5 weeks, CALVIN 3/24/23 dated by LMP (22) consistent with 1st Trimester US, presents to L&D for scheduled cerclage placement due to incidental finding of cervical shortening on anatomy scan, recent CL 0.62 cm with funneling noted on . NPO TIME 9pm last night. Patient states she has not felt fetal movements yet. Denies vaginal bleeding, leakage of fluid, painful contractions/lower abdominal cramping, fever/chills, shortness of breath,  chest pain, increased swelling, difficulty ambulating, loss of taste/smell, nausea/vomiting/diarrhea, rash, weakness, paresthesia, change in appetite, dizziness, lightheadedness, cough, nasal congestion, runny nose    OB History: : SAB, medical,   G2: Current pregnancy, complicated by cervical shortening     GYN Hx: Denies fibroids, ovarian cysts, HSV/ STDs, abnormal pap smears     Med Hx: Denies asthma, HTN, DM, CAD, or other medical issues    Meds: PNV, Vaginal Progesterone, denies other medications including prescribed/OTC     Allergies: NKDA, NKEA, Peanuts - Anaphylaxis    Surgical Hx: Denies previous surgeries     Social: Denies cigarette/tobacco/alcohol/illicit drug use    Psych: Denies anxiety/depression/other mental health disease    Physical Exam:  Vitals: ICU Vital Signs Last 24 Hrs  T(C): 36.8 (2022 09:35), Max: 36.8 (2022 09:35)  T(F): 98.2 (2022 09:35), Max: 98.2 (2022 09:35)  HR: 74 (2022 09:39) (74 - 90)  BP: 123/72 (2022 09:39) (104/69 - 123/72)  BP(mean): 80 (2022 10:20) (80 - 80)  ABP: --  ABP(mean): --  RR: 16 (2022 09:35) (16 - 16)  SpO2: 99% (2022 10:20) (99% - 99%)      Gen: NAD, A+O x 3, resting comfortably  Resp: CTAB  Cardio: RRR  Abd: Gravid, soft, non-distended, non-tender to superficial and deep palpation in all 4 quadrants, no rebound/guarding  Ext: Warm, well perfused, 1+ nonpitting edema bilaterally    Bedside Transabdominal US: SIUP, posterior placenta, +FHR with 154 bpm by PW doppler on sono +FM noted

## 2022-11-17 ENCOUNTER — APPOINTMENT (OUTPATIENT)
Dept: OBGYN | Facility: CLINIC | Age: 27
End: 2022-11-17

## 2022-11-17 VITALS
DIASTOLIC BLOOD PRESSURE: 64 MMHG | WEIGHT: 174 LBS | BODY MASS INDEX: 25.77 KG/M2 | SYSTOLIC BLOOD PRESSURE: 129 MMHG | HEIGHT: 69 IN | HEART RATE: 92 BPM

## 2022-11-17 DIAGNOSIS — N89.8 OTHER SPECIFIED NONINFLAMMATORY DISORDERS OF VAGINA: ICD-10-CM

## 2022-11-17 DIAGNOSIS — Z34.02 ENCOUNTER FOR SUPERVISION OF NORMAL FIRST PREGNANCY, SECOND TRIMESTER: ICD-10-CM

## 2022-11-17 DIAGNOSIS — O26.872 CERVICAL SHORTENING, SECOND TRIMESTER: ICD-10-CM

## 2022-11-17 PROCEDURE — 0502F SUBSEQUENT PRENATAL CARE: CPT

## 2022-11-17 RX ORDER — TERCONAZOLE 8 MG/G
0.8 CREAM VAGINAL
Qty: 1 | Refills: 0 | Status: ACTIVE | COMMUNITY
Start: 2022-11-17 | End: 1900-01-01

## 2022-11-21 ENCOUNTER — APPOINTMENT (OUTPATIENT)
Dept: ANTEPARTUM | Facility: CLINIC | Age: 27
End: 2022-11-21

## 2022-11-21 ENCOUNTER — ASOB RESULT (OUTPATIENT)
Age: 27
End: 2022-11-21

## 2022-11-21 PROCEDURE — 99212 OFFICE O/P EST SF 10 MIN: CPT | Mod: 25

## 2022-11-21 PROCEDURE — 76817 TRANSVAGINAL US OBSTETRIC: CPT

## 2022-11-21 PROCEDURE — 76815 OB US LIMITED FETUS(S): CPT

## 2022-11-24 ENCOUNTER — NON-APPOINTMENT (OUTPATIENT)
Age: 27
End: 2022-11-24

## 2022-11-25 DIAGNOSIS — B96.89 INFECTION OF OTHER PART OF GENITAL TRACT IN PREGNANCY, UNSPECIFIED TRIMESTER: ICD-10-CM

## 2022-11-25 DIAGNOSIS — O23.599 INFECTION OF OTHER PART OF GENITAL TRACT IN PREGNANCY, UNSPECIFIED TRIMESTER: ICD-10-CM

## 2022-11-25 LAB
ADDITIONAL US: NORMAL
AFP MOM: 0.77
AFP VALUE: 28.6 NG/ML
CANDIDA VAG CYTO: DETECTED
COLLECTED ON 2: NORMAL
COLLECTED ON: NORMAL
CRL SCAN TWIN B: NORMAL
CRL SCAN: NORMAL
CROWN RUMP LENGTH TWIN B: NORMAL
CROWN RUMP LENGTH: 64.9 MM
DIA MOM: 0.62
DIA VALUE: 89.9 PG/ML
DOWN SYNDROME AGE RISK: NORMAL
DOWN SYNDROME INTERPRETATION: NORMAL
DOWN SYNDROME SCREENING RISK: NORMAL
FIRST TRIMESTER SAMPLE: NORMAL
G VAGINALIS+PREV SP MTYP VAG QL MICRO: DETECTED
GEST. AGE ON COLLECTION DATE: 12.7 WEEKS
GESTATIONAL AGE: 16.7 WEEKS
HCG MOM: 0.83
HCG VALUE: 25.9 IU/ML
INSULIN DEP DIABETES: NORMAL
MATERNAL AGE AT EDD: 25.5 YR
NT MOM TWIN B: NORMAL
NT TWIN B: NORMAL
NUCHAL TRANSLUCENCY (NT): 1.4 MM
NUCHAL TRANSLUCENCY MOM: 1.04
NUMBER OF FETUSES: 1
OPEN SPINA BIFIDA: NORMAL
OSB INTERPRETATION: NORMAL
PAPP-A MOM: 0.66
PAPP-A VALUE: 622.2 NG/ML
RACE: NORMAL
SECOND TRIMESTER SAMPLE: NORMAL
SEQUENTIAL 2 COMMENTS: NORMAL
SEQUENTIAL 2 NOTE: NORMAL
SEQUENTIAL 2 RESULTS: NORMAL
SEQUENTIAL 2 TEST RESULTS: NORMAL
SONOGRAPHER ID#: NORMAL
T VAGINALIS VAG QL WET PREP: NOT DETECTED
TRISOMY 18 AGE RISK: NORMAL
TRISOMY 18 INTERPRETATION: NORMAL
TRISOMY 18 SCREENING RISK: NORMAL
UE3 MOM: 1.61
UE3 VALUE: 1.74 NG/ML
WEIGHT AFP: 164 LBS
WEIGHT: 168 LBS

## 2022-11-28 ENCOUNTER — APPOINTMENT (OUTPATIENT)
Dept: ANTEPARTUM | Facility: CLINIC | Age: 27
End: 2022-11-28

## 2022-11-28 ENCOUNTER — OUTPATIENT (OUTPATIENT)
Dept: INPATIENT UNIT | Facility: HOSPITAL | Age: 27
LOS: 1 days | Discharge: HOME | End: 2022-11-28

## 2022-11-28 ENCOUNTER — ASOB RESULT (OUTPATIENT)
Age: 27
End: 2022-11-28

## 2022-11-28 VITALS — HEART RATE: 77 BPM | SYSTOLIC BLOOD PRESSURE: 110 MMHG | DIASTOLIC BLOOD PRESSURE: 64 MMHG

## 2022-11-28 VITALS — TEMPERATURE: 98 F

## 2022-11-28 DIAGNOSIS — O26.899 OTHER SPECIFIED PREGNANCY RELATED CONDITIONS, UNSPECIFIED TRIMESTER: ICD-10-CM

## 2022-11-28 DIAGNOSIS — Z3A.00 WEEKS OF GESTATION OF PREGNANCY NOT SPECIFIED: ICD-10-CM

## 2022-11-28 PROCEDURE — 99212 OFFICE O/P EST SF 10 MIN: CPT | Mod: 25

## 2022-11-28 PROCEDURE — 76817 TRANSVAGINAL US OBSTETRIC: CPT

## 2022-11-28 PROCEDURE — 99214 OFFICE O/P EST MOD 30 MIN: CPT | Mod: GC,25

## 2022-11-28 NOTE — CONSULT NOTE ADULT - TIME BILLING
I saw and evaluated Ms. Sheldon and agree with excellent Fellow assessment and plan as above.   Discussed with Dr. Giron on the day of the visit.     Aylin Del Valle MD

## 2022-11-28 NOTE — OB PROVIDER TRIAGE NOTE - HISTORY OF PRESENT ILLNESS
History obtained and taken by M fellow, Chelsea Whelan.     HPI: The patient is a 27 year old  at 23w3d dated by LMP 22 with history of short cervix and cervical insufficiency with exam-indicated Mohan cerclage placed  presenting for incidental cervical shortening (cervical length 0.28) and funneling on surveillance. Patient reports recent cough due to viral illness (home COVID test negative); states other than when she coughs does not have pelvic pressure. Denies cramping/contractions, vaginal bleeding, leakage of fluid. Denies vaginal irritation or itching. Feels fetal movements. Other than cervical insufficiency, she denies any prenatal issues in the pregnancy, reports low risk NIPS. Prenatal care with Dr. Hobbs.     History:   OB: 7 week SAB, current  Gyn: Irregular menses, no h/o fibroids or cysts   PMH: Denies  PSH: Cerclage placement   Meds: Vaginal progesterone, PNV  Alls: Peanuts (anaphylaxis)   SH: Occasional MJ prior to pregnancy, no other drugs  FMH: HTN, DM second degree relatives     Objective:     T(C): 36.5 (22 @ 13:12), Max: 36.5 (22 @ 13:11)  HR: 88 (22 @ 13:12) (88 - 88)  BP: 116/66 (22 @ 13:12) (116/66 - 116/66)  RR: 17 (22 @ 13:12) (17 - 17)  SpO2: --    Exam:   Gen: Well-appearing, no distress   Abd: no fundal tenderness  Gyn: Milky white nonmalodorous vaginal discharge, cervical os visually 1cm dilated, friable with small area of bleeding, no mucopurulent discharge   Cervical os 150/-3 at 2pm     Sono (ATU 22): Cephalic, placenta posterior, maximal cervical length 0.28cm, funneling beyond cerclage seen     Triage biometry: MVP 5.3cm,   BPD c/w 23w0d  HC c/w 23w0d  AC c/w 23w3d  FL c/w 24w0d   EFW 615g c/w 23w3d (54%ile)    Tracin bpm mod variability +acceleration (10x10 seen), -deceleration  Some irritability seen on initial toco without regular contractions

## 2022-11-28 NOTE — OB RN TRIAGE NOTE - FALL HARM RISK - UNIVERSAL INTERVENTIONS
Bed in lowest position, wheels locked, appropriate side rails in place/Call bell, personal items and telephone in reach/Instruct patient to call for assistance before getting out of bed or chair/Non-slip footwear when patient is out of bed/[place] to call system/Physically safe environment - no spills, clutter or unnecessary equipment/Purposeful Proactive Rounding/Room/bathroom lighting operational, light cord in reach

## 2022-11-28 NOTE — OB PROVIDER TRIAGE NOTE - ADDITIONAL INSTRUCTIONS
Follow up with Dr. Hobbs in 1 week.     Please follow up with your OB doctor this week.  Return to L&D if you experience contractions every 3-5 minutes, leaking of fluid, vaginal bleeding like a period, or less than 5 fetal movements per hour.

## 2022-11-28 NOTE — OB RN TRIAGE NOTE - CURRENT PREGNANCY COMPLICATIONS, OB PROFILE
cerclage placed on 11/9/22/Incompetent Cervix/Cervical Insufficiency/Gestational Age less than 36 Weeks/Other

## 2022-11-28 NOTE — CONSULT NOTE ADULT - SUBJECTIVE AND OBJECTIVE BOX
Maternal Fetal Medicine Consult Note    HPI: The patient is a *** at 23w3d dated by LMP 6/17/22 with history of short cervix and advanced cervical dilation with exam-indicated Mohan cerclage placed 11/9 presenting for cervical shortening and funneling. Patient reports recent cough due to viral illness (home COVID test negative); states other than when she coughs does not have pelvic pressure. Denies cramping/contractions, vaginal bleeding, leakage of fluid. Feels fetal movements.     History:       Objective:     T(C): 36.5 (11-28-22 @ 13:12), Max: 36.5 (11-28-22 @ 13:11)  HR: 88 (11-28-22 @ 13:12) (88 - 88)  BP: 116/66 (11-28-22 @ 13:12) (116/66 - 116/66)  RR: 17 (11-28-22 @ 13:12) (17 - 17)  SpO2: --    Exam:   Gen: Well-appearing, no distress   Abd: no fundal tenderness  Gyn: ***     Sono (ATU 11/28/22):   Cephalic, placenta posterior, maximal cervical length 0.28cm, funneling beyond cerclage seen     Assessment:     Recommendations:   -***   -GC/CT/trich testing     MILAN Rai Fellow  D/w Dr. Del Valle    Maternal Fetal Medicine Consult Note    HPI: The patient is a *** at 23w3d dated by LMP 22 with history of short cervix and advanced cervical dilation with exam-indicated Mohan cerclage placed  presenting for cervical shortening and funneling. Patient reports recent cough due to viral illness (home COVID test negative); states other than when she coughs does not have pelvic pressure. Denies cramping/contractions, vaginal bleeding, leakage of fluid. Feels fetal movements.     History:       Objective:     T(C): 36.5 (22 @ 13:12), Max: 36.5 (22 @ 13:11)  HR: 88 (22 @ 13:12) (88 - 88)  BP: 116/66 (22 @ 13:12) (116/66 - 116/66)  RR: 17 (22 @ 13:12) (17 - 17)  SpO2: --    Exam:   Gen: Well-appearing, no distress   Abd: no fundal tenderness  Gyn: ***     Sono (ATU 22):   Cephalic, placenta posterior, maximal cervical length 0.28cm, funneling beyond cerclage seen     Tracin bpm mod variability +acceleration (10x10 seen), -deceleration  Some irritability seen on initial toco without regular contractions     Assessment: Fetus is vertex presenting with tracing appropriate for gestational age.     Recommendations:   -***   -GC/CT/trich testing     MILAN Rai Fellow  D/w Dr. Del Valle    Maternal Fetal Medicine Consult Note    HPI: The patient is a  at 23w3d dated by LMP 22 with history of short cervix and advanced cervical dilation with exam-indicated Mohan cerclage placed  presenting for cervical shortening and funneling. Patient reports recent cough due to viral illness (home COVID test negative); states other than when she coughs does not have pelvic pressure. Denies cramping/contractions, vaginal bleeding, leakage of fluid. Feels fetal movements. Denies any prenatal issues in the pregnancy, reports low risk NIPS.     History:   OB: 7 week SAB, current  Gyn: Irregular menses, no h/o fibroids or cysts   PMH: Denies  PSH: Cerclage placement   Meds: Vaginal progesterone, PNV  Alls: Peanuts (anaphylaxis)   SH: Occasional MJ prior to pregnancy, no other drugs  FMH: HTN, DM second degree relatives     Objective:     T(C): 36.5 (22 @ 13:12), Max: 36.5 (22 @ 13:11)  HR: 88 (22 @ 13:12) (88 - 88)  BP: 116/66 (22 @ 13:12) (116/66 - 116/66)  RR: 17 (22 @ 13:12) (17 - 17)  SpO2: --    Exam:   Gen: Well-appearing, no distress   Abd: no fundal tenderness  Gyn:   Cervical os /-3     Sono (ATU 22):   Cephalic, placenta posterior, maximal cervical length 0.28cm, funneling beyond cerclage seen     Tracin bpm mod variability +acceleration (10x10 seen), -deceleration  Some irritability seen on initial toco without regular contractions     Assessment: Fetus is vertex presenting with tracing appropriate for gestational age.     Recommendations:   -GBS collected  -Continue vaginal progetserone  -GC/CT/trich testing     MILAN Rai Fellow  D/w Dr. Del Valle    Maternal Fetal Medicine Consult Note    HPI: The patient is a 27 year old  at 23w3d dated by LMP 22 with history of short cervix and advanced cervical dilation with exam-indicated Mohan cerclage placed  presenting for incidental cervical shortening and funneling on surveillance. Patient reports recent cough due to viral illness (home COVID test negative); states other than when she coughs does not have pelvic pressure. Denies cramping/contractions, vaginal bleeding, leakage of fluid. Denies vaginal irritation or itching. Feels fetal movements. Other than cervical insufficiency, she denies any prenatal issues in the pregnancy, reports low risk NIPS. Prenatal care with Dr. Hobbs.     History:   OB: 7 week SAB, current  Gyn: Irregular menses, no h/o fibroids or cysts   PMH: Denies  PSH: Cerclage placement   Meds: Vaginal progesterone, PNV  Alls: Peanuts (anaphylaxis)   SH: Occasional MJ prior to pregnancy, no other drugs  FMH: HTN, DM second degree relatives     Objective:     T(C): 36.5 (22 @ 13:12), Max: 36.5 (22 @ 13:11)  HR: 88 (22 @ 13:12) (88 - 88)  BP: 116/66 (22 @ 13:12) (116/66 - 116/)  RR: 17 (22 @ 13:12) (17 - 17)  SpO2: --    Exam:   Gen: Well-appearing, no distress   Abd: no fundal tenderness  Gyn: Milky white nonmalodorous vaginal discharge, cervical os visually 1cm dilated, friable with small area of bleeding, no mucopurulent discharge   Cervical os 50/-3 at 2pm     Sono (ATU 22): Cephalic, placenta posterior, maximal cervical length 0.28cm, funneling beyond cerclage seen     Triage sono: ***     Tracin bpm mod variability +acceleration (10x10 seen), -deceleration  Some irritability seen on initial toco without regular contractions      Maternal Fetal Medicine Consult Note    HPI: The patient is a 27 year old  at 23w3d dated by LMP 22 with history of short cervix and cervical insufficiency with exam-indicated Mohan cerclage placed  presenting for incidental cervical shortening (cervical length 0.28) and funneling on surveillance. Patient reports recent cough due to viral illness (home COVID test negative); states other than when she coughs does not have pelvic pressure. Denies cramping/contractions, vaginal bleeding, leakage of fluid. Denies vaginal irritation or itching. Feels fetal movements. Other than cervical insufficiency, she denies any prenatal issues in the pregnancy, reports low risk NIPS. Prenatal care with Dr. Hobbs.     History:   OB: 7 week SAB, current  Gyn: Irregular menses, no h/o fibroids or cysts   PMH: Denies  PSH: Cerclage placement   Meds: Vaginal progesterone, PNV  Alls: Peanuts (anaphylaxis)   SH: Occasional MJ prior to pregnancy, no other drugs  FMH: HTN, DM second degree relatives     Objective:     T(C): 36.5 (22 @ 13:12), Max: 36.5 (22 @ 13:11)  HR: 88 (22 @ 13:12) (88 - 88)  BP: 116/66 (22 @ 13:12) (116/66 - 116/66)  RR: 17 (22 @ 13:12) (17 - 17)  SpO2: --    Exam:   Gen: Well-appearing, no distress   Abd: no fundal tenderness  Gyn: Milky white nonmalodorous vaginal discharge, cervical os visually 1cm dilated, friable with small area of bleeding, no mucopurulent discharge   Cervical os 50/-3 at 2pm     Sono (ATU 22): Cephalic, placenta posterior, maximal cervical length 0.28cm, funneling beyond cerclage seen     Triage sono: ***     Tracin bpm mod variability +acceleration (10x10 seen), -deceleration  Some irritability seen on initial toco without regular contractions      Maternal Fetal Medicine Consult Note    HPI: The patient is a 27 year old  at 23w3d dated by LMP 22 with history of short cervix and cervical insufficiency with exam-indicated Mohan cerclage placed  presenting for incidental cervical shortening (cervical length 0.28) and funneling on surveillance. Patient reports recent cough due to viral illness (home COVID test negative); states other than when she coughs does not have pelvic pressure. Denies cramping/contractions, vaginal bleeding, leakage of fluid. Denies vaginal irritation or itching. Feels fetal movements. Other than cervical insufficiency, she denies any prenatal issues in the pregnancy, reports low risk NIPS. Prenatal care with Dr. Hobbs.     History:   OB: 7 week SAB, current  Gyn: Irregular menses, no h/o fibroids or cysts   PMH: Denies  PSH: Cerclage placement   Meds: Vaginal progesterone, PNV  Alls: Peanuts (anaphylaxis)   SH: Occasional MJ prior to pregnancy, no other drugs  FMH: HTN, DM second degree relatives     Objective:     T(C): 36.5 (22 @ 13:12), Max: 36.5 (22 @ 13:11)  HR: 88 (22 @ 13:12) (88 - 88)  BP: 116/66 (22 @ 13:12) (116/66 - 116/66)  RR: 17 (22 @ 13:12) (17 - 17)  SpO2: --    Exam:   Gen: Well-appearing, no distress   Abd: no fundal tenderness  Gyn: Milky white nonmalodorous vaginal discharge, cervical os visually 1cm dilated, friable with small area of bleeding, no mucopurulent discharge   Cervical os 50/-3 at 2pm     Sono (ATU 22): Cephalic, placenta posterior, maximal cervical length 0.28cm, funneling beyond cerclage seen     Triage biometry: MVP 5.3cm,   BPD c/w 23w0d  HC c/w 23w0d  AC c/w 23w3d  FL c/w 24w0d   EFW 615g c/w 23w3d (54%ile)    Tracin bpm mod variability +acceleration (10x10 seen), -deceleration  Some irritability seen on initial toco without regular contractions      Maternal Fetal Medicine Consult Note    HPI: The patient is a 27 year old  at 23w3d dated by LMP 22 with history of short cervix and cervical insufficiency with exam-indicated Mohan cerclage placed  presenting for incidental cervical shortening (cervical length 0.28) and funneling on surveillance. Patient reports recent cough due to viral illness (home COVID test negative); states other than when she coughs does not have pelvic pressure. Denies cramping/contractions, vaginal bleeding, leakage of fluid. Denies vaginal irritation or itching. Feels fetal movements. Other than cervical insufficiency, she denies any prenatal issues in the pregnancy, reports low risk NIPS. Prenatal care with Dr. Hobbs.     History:   OB: 7 week SAB, current  Gyn: Irregular menses, no h/o fibroids or cysts   PMH: Denies  PSH: Cerclage placement   Meds: Vaginal progesterone, PNV  Alls: Peanuts (anaphylaxis)   SH: Occasional MJ prior to pregnancy, no other drugs  FMH: HTN, DM second degree relatives     Objective:     T(C): 36.5 (22 @ 13:12), Max: 36.5 (22 @ 13:11)  HR: 88 (22 @ 13:12) (88 - 88)  BP: 116/66 (22 @ 13:12) (116/66 - 116/66)  RR: 17 (22 @ 13:12) (17 - 17)  SpO2: --    Exam:   Gen: Well-appearing, no distress   Abd: no fundal tenderness  Gyn: Milky white nonmalodorous vaginal discharge, cervical os visually 1cm dilated, friable with small area of bleeding, no mucopurulent discharge   Cervical os 150/-3 at 2pm, no cervical tenderness     Sono (ATU 22): Cephalic, placenta posterior, maximal cervical length 0.28cm, funneling beyond cerclage seen     Triage biometry: MVP 5.3cm,   BPD c/w 23w0d  HC c/w 23w0d  AC c/w 23w3d  FL c/w 24w0d   EFW 615g c/w w3d (54%ile)    Tracin bpm mod variability +acceleration (10x10 seen), -deceleration  Some irritability seen on initial toco without regular contractions

## 2022-11-28 NOTE — OB PROVIDER TRIAGE NOTE - NSOBPROVIDERNOTE_OBGYN_ALL_OB_FT
27 year old  at 23w3d with short cervix, cervical insufficiency presenting with funneling and further shortening in clinic following exam-indicated cerclage, found to be 1cm dilated in triage. Patient is comfortable without regular contractions on toco or other symptoms of labor. Fetus is vertex presenting with tracing appropriate for gestational age.     Plan:    -cerclage not recommended  - patient Is declining betamethasone, extensively counseled by MFM - please refer to note  -GBS collected. GC/CT/trich testing   -Continue vaginal progesterone    d/w Dr. Whelan and Ivon  ADomney PGY-4

## 2022-11-28 NOTE — OB RN TRIAGE NOTE - NSICDXPASTMEDICALHX_GEN_ALL_CORE_FT
PAST MEDICAL HISTORY:  History of ovarian cyst left    Syncope past episode of syncope many years ago no recurrence

## 2022-11-28 NOTE — OB PROVIDER TRIAGE NOTE - NS_DCGESTAGE_OBGYN_ALL_OB_FT
73 yo male with hypertensive heart disease, ESRD s/p renal transplant admitted with shortness of breath secondary to acute diastolic heart failure and renal failure. 23w3d

## 2022-11-29 LAB
GROUP B BETA STREP DNA (PCR): DETECTED
SOURCE GROUP B STREP: SIGNIFICANT CHANGE UP
SPECIMEN SOURCE: SIGNIFICANT CHANGE UP
T VAGINALIS RRNA SPEC QL NAA+PROBE: SIGNIFICANT CHANGE UP

## 2022-12-01 ENCOUNTER — APPOINTMENT (OUTPATIENT)
Dept: ANTEPARTUM | Facility: HOSPITAL | Age: 27
End: 2022-12-01

## 2022-12-01 ENCOUNTER — INPATIENT (INPATIENT)
Facility: HOSPITAL | Age: 27
LOS: 13 days | Discharge: HOME | End: 2022-12-15
Attending: OBSTETRICS & GYNECOLOGY | Admitting: OBSTETRICS & GYNECOLOGY
Payer: COMMERCIAL

## 2022-12-01 ENCOUNTER — ASOB RESULT (OUTPATIENT)
Age: 27
End: 2022-12-01

## 2022-12-01 VITALS
HEART RATE: 77 BPM | DIASTOLIC BLOOD PRESSURE: 68 MMHG | TEMPERATURE: 98 F | RESPIRATION RATE: 15 BRPM | SYSTOLIC BLOOD PRESSURE: 115 MMHG

## 2022-12-01 DIAGNOSIS — O42.919 PRETERM PREMATURE RUPTURE OF MEMBRANES, UNSPECIFIED AS TO LENGTH OF TIME BETWEEN RUPTURE AND ONSET OF LABOR, UNSPECIFIED TRIMESTER: ICD-10-CM

## 2022-12-01 DIAGNOSIS — O09.299 SUPERVISION OF PREGNANCY WITH OTHER POOR REPRODUCTIVE OR OBSTETRIC HISTORY, UNSPECIFIED TRIMESTER: Chronic | ICD-10-CM

## 2022-12-01 DIAGNOSIS — O26.899 OTHER SPECIFIED PREGNANCY RELATED CONDITIONS, UNSPECIFIED TRIMESTER: ICD-10-CM

## 2022-12-01 DIAGNOSIS — Z3A.00 WEEKS OF GESTATION OF PREGNANCY NOT SPECIFIED: ICD-10-CM

## 2022-12-01 PROBLEM — Z87.42 PERSONAL HISTORY OF OTHER DISEASES OF THE FEMALE GENITAL TRACT: Chronic | Status: ACTIVE | Noted: 2022-11-28

## 2022-12-01 LAB
BASOPHILS # BLD AUTO: 0.03 K/UL — SIGNIFICANT CHANGE UP (ref 0–0.2)
BASOPHILS NFR BLD AUTO: 0.4 % — SIGNIFICANT CHANGE UP (ref 0–2)
BLD GP AB SCN SERPL QL: NEGATIVE — SIGNIFICANT CHANGE UP
COVID-19 SPIKE DOMAIN AB INTERP: POSITIVE
COVID-19 SPIKE DOMAIN ANTIBODY RESULT: >250 U/ML — HIGH
EOSINOPHIL # BLD AUTO: 0.13 K/UL — SIGNIFICANT CHANGE UP (ref 0–0.5)
EOSINOPHIL NFR BLD AUTO: 1.7 % — SIGNIFICANT CHANGE UP (ref 0–6)
HCT VFR BLD CALC: 30.1 % — LOW (ref 34.5–45)
HGB BLD-MCNC: 9.6 G/DL — LOW (ref 11.5–15.5)
IANC: 4.78 K/UL — SIGNIFICANT CHANGE UP (ref 1.8–7.4)
IMM GRANULOCYTES NFR BLD AUTO: 1.2 % — HIGH (ref 0–0.9)
LYMPHOCYTES # BLD AUTO: 2.03 K/UL — SIGNIFICANT CHANGE UP (ref 1–3.3)
LYMPHOCYTES # BLD AUTO: 26.9 % — SIGNIFICANT CHANGE UP (ref 13–44)
MAGNESIUM SERPL-MCNC: 4.7 MG/DL — HIGH (ref 1.6–2.6)
MAGNESIUM SERPL-MCNC: 5.6 MG/DL — HIGH (ref 1.6–2.6)
MAGNESIUM SERPL-MCNC: 5.8 MG/DL — HIGH (ref 1.6–2.6)
MCHC RBC-ENTMCNC: 26.6 PG — LOW (ref 27–34)
MCHC RBC-ENTMCNC: 31.9 GM/DL — LOW (ref 32–36)
MCV RBC AUTO: 83.4 FL — SIGNIFICANT CHANGE UP (ref 80–100)
MONOCYTES # BLD AUTO: 0.48 K/UL — SIGNIFICANT CHANGE UP (ref 0–0.9)
MONOCYTES NFR BLD AUTO: 6.4 % — SIGNIFICANT CHANGE UP (ref 2–14)
NEUTROPHILS # BLD AUTO: 4.78 K/UL — SIGNIFICANT CHANGE UP (ref 1.8–7.4)
NEUTROPHILS NFR BLD AUTO: 63.4 % — SIGNIFICANT CHANGE UP (ref 43–77)
NRBC # BLD: 0 /100 WBCS — SIGNIFICANT CHANGE UP (ref 0–0)
NRBC # FLD: 0 K/UL — SIGNIFICANT CHANGE UP (ref 0–0)
PLATELET # BLD AUTO: 343 K/UL — SIGNIFICANT CHANGE UP (ref 150–400)
RBC # BLD: 3.61 M/UL — LOW (ref 3.8–5.2)
RBC # FLD: 13.2 % — SIGNIFICANT CHANGE UP (ref 10.3–14.5)
RH IG SCN BLD-IMP: POSITIVE — SIGNIFICANT CHANGE UP
SARS-COV-2 IGG+IGM SERPL QL IA: >250 U/ML — HIGH
SARS-COV-2 IGG+IGM SERPL QL IA: POSITIVE
SARS-COV-2 RNA SPEC QL NAA+PROBE: SIGNIFICANT CHANGE UP
T PALLIDUM AB TITR SER: NEGATIVE — SIGNIFICANT CHANGE UP
WBC # BLD: 7.54 K/UL — SIGNIFICANT CHANGE UP (ref 3.8–10.5)
WBC # FLD AUTO: 7.54 K/UL — SIGNIFICANT CHANGE UP (ref 3.8–10.5)

## 2022-12-01 PROCEDURE — 76815 OB US LIMITED FETUS(S): CPT | Mod: 26

## 2022-12-01 PROCEDURE — 76705 ECHO EXAM OF ABDOMEN: CPT | Mod: 26

## 2022-12-01 PROCEDURE — 99223 1ST HOSP IP/OBS HIGH 75: CPT

## 2022-12-01 RX ORDER — AMOXICILLIN 250 MG/5ML
500 SUSPENSION, RECONSTITUTED, ORAL (ML) ORAL EVERY 8 HOURS
Refills: 0 | Status: COMPLETED | OUTPATIENT
Start: 2022-12-01

## 2022-12-01 RX ORDER — AMOXICILLIN 250 MG/5ML
500 SUSPENSION, RECONSTITUTED, ORAL (ML) ORAL EVERY 8 HOURS
Refills: 0 | Status: DISCONTINUED | OUTPATIENT
Start: 2022-12-01 | End: 2022-12-01

## 2022-12-01 RX ORDER — AMPICILLIN TRIHYDRATE 250 MG
CAPSULE ORAL
Refills: 0 | Status: DISCONTINUED | OUTPATIENT
Start: 2022-12-01 | End: 2022-12-01

## 2022-12-01 RX ORDER — AZITHROMYCIN 500 MG/1
1000 TABLET, FILM COATED ORAL ONCE
Refills: 0 | Status: COMPLETED | OUTPATIENT
Start: 2022-12-01 | End: 2022-12-01

## 2022-12-01 RX ORDER — AMPICILLIN TRIHYDRATE 250 MG
2 CAPSULE ORAL ONCE
Refills: 0 | Status: COMPLETED | OUTPATIENT
Start: 2022-12-01 | End: 2022-12-01

## 2022-12-01 RX ORDER — SODIUM CHLORIDE 9 MG/ML
1000 INJECTION, SOLUTION INTRAVENOUS
Refills: 0 | Status: DISCONTINUED | OUTPATIENT
Start: 2022-12-01 | End: 2022-12-02

## 2022-12-01 RX ORDER — AMPICILLIN TRIHYDRATE 250 MG
2 CAPSULE ORAL EVERY 6 HOURS
Refills: 0 | Status: COMPLETED | OUTPATIENT
Start: 2022-12-01 | End: 2022-12-02

## 2022-12-01 RX ORDER — ASCORBIC ACID 60 MG
500 TABLET,CHEWABLE ORAL DAILY
Refills: 0 | Status: DISCONTINUED | OUTPATIENT
Start: 2022-12-01 | End: 2022-12-11

## 2022-12-01 RX ORDER — FERROUS SULFATE 325(65) MG
325 TABLET ORAL DAILY
Refills: 0 | Status: DISCONTINUED | OUTPATIENT
Start: 2022-12-01 | End: 2022-12-11

## 2022-12-01 RX ORDER — MAGNESIUM SULFATE 500 MG/ML
2 VIAL (ML) INJECTION
Qty: 40 | Refills: 0 | Status: DISCONTINUED | OUTPATIENT
Start: 2022-12-01 | End: 2022-12-01

## 2022-12-01 RX ORDER — MAGNESIUM SULFATE 500 MG/ML
4 VIAL (ML) INJECTION ONCE
Refills: 0 | Status: COMPLETED | OUTPATIENT
Start: 2022-12-01 | End: 2022-12-01

## 2022-12-01 RX ORDER — AMPICILLIN TRIHYDRATE 250 MG
2 CAPSULE ORAL EVERY 6 HOURS
Refills: 0 | Status: DISCONTINUED | OUTPATIENT
Start: 2022-12-01 | End: 2022-12-01

## 2022-12-01 RX ORDER — MAGNESIUM SULFATE 500 MG/ML
2 VIAL (ML) INJECTION
Qty: 40 | Refills: 0 | Status: DISCONTINUED | OUTPATIENT
Start: 2022-12-01 | End: 2022-12-02

## 2022-12-01 RX ORDER — SODIUM CHLORIDE 9 MG/ML
1000 INJECTION, SOLUTION INTRAVENOUS
Refills: 0 | Status: DISCONTINUED | OUTPATIENT
Start: 2022-12-01 | End: 2022-12-01

## 2022-12-01 RX ADMIN — AZITHROMYCIN 500 MILLIGRAM(S): 500 TABLET, FILM COATED ORAL at 04:15

## 2022-12-01 RX ADMIN — Medication 200 GRAM(S): at 16:34

## 2022-12-01 RX ADMIN — Medication 50 GM/HR: at 10:32

## 2022-12-01 RX ADMIN — Medication 1 TABLET(S): at 16:33

## 2022-12-01 RX ADMIN — Medication 200 GRAM(S): at 22:50

## 2022-12-01 RX ADMIN — Medication 100 GRAM(S): at 04:10

## 2022-12-01 RX ADMIN — Medication 300 GRAM(S): at 03:41

## 2022-12-01 RX ADMIN — Medication 50 GM/HR: at 07:16

## 2022-12-01 RX ADMIN — Medication 50 GM/HR: at 04:06

## 2022-12-01 RX ADMIN — Medication 500 MILLIGRAM(S): at 16:33

## 2022-12-01 RX ADMIN — Medication 50 GM/HR: at 19:05

## 2022-12-01 RX ADMIN — Medication 325 MILLIGRAM(S): at 16:34

## 2022-12-01 RX ADMIN — Medication 12 MILLIGRAM(S): at 03:30

## 2022-12-01 RX ADMIN — Medication 100 GRAM(S): at 09:53

## 2022-12-01 NOTE — OB PROVIDER TRIAGE NOTE - NSHPPHYSICALEXAM_GEN_ALL_CORE
Vital Signs Last 24 Hrs  T(C): 36.9 (01 Dec 2022 01:07), Max: 36.9 (01 Dec 2022 01:07)  T(F): 98.4 (01 Dec 2022 01:07), Max: 98.4 (01 Dec 2022 01:07)  HR: 80 (01 Dec 2022 01:33) (77 - 80)  BP: 109/62 (01 Dec 2022 01:33) (109/62 - 115/68)  BP(mean): --  RR: 15 (01 Dec 2022 01:07) (15 - 15)  SpO2: --    Assessment reveals VSS  Abdomen soft, NT, gravid   mod. variability no accels no decels, no contractions on toco   Transabdominal Ultrasound- Transverse presentation   Sterile Speculum- positive pooling, nitrazine positive, FERN positive, Cervix appears closed externally, cerclage stich visualized, no tension noted   Transvaginal Ultrasound -deferred   Vaginal Exam- deferred   A&Ox3  Lungs- clear bilateral  Heart- normal rate and rhythm Vital Signs Last 24 Hrs  T(C): 36.9 (01 Dec 2022 01:07), Max: 36.9 (01 Dec 2022 01:07)  T(F): 98.4 (01 Dec 2022 01:07), Max: 98.4 (01 Dec 2022 01:07)  HR: 80 (01 Dec 2022 01:33) (77 - 80)  BP: 109/62 (01 Dec 2022 01:33) (109/62 - 115/68)  BP(mean): --  RR: 15 (01 Dec 2022 01:07) (15 - 15)  SpO2: --    Assessment reveals VSS  Abdomen soft, NT, gravid   mod. variability no accels no decels, no contractions on toco   Transabdominal Ultrasound- Transverse presentation , MVP 3.27  Sterile Speculum- positive pooling, nitrazine positive, FERN positive, Cervix appears closed externally, cerclage stich visualized, no tension noted   Transvaginal Ultrasound -deferred   Vaginal Exam- deferred   A&Ox3  Lungs- clear bilateral  Heart- normal rate and rhythm

## 2022-12-01 NOTE — OB RN TRIAGE NOTE - CURRENT PREGNANCY COMPLICATIONS, OB PROFILE
cerclage in place  since 11/9/Incompetent Cervix/Cervical Insufficiency/Gestational Age less than 36 Weeks

## 2022-12-01 NOTE — OB PROVIDER TRIAGE NOTE - HISTORY OF PRESENT ILLNESS
27 year old  at 23.6 presents with c/o leaking of fluid since 11pm on  clear fluids. Denies contractions, vaginal bleeding, reports good fetal movement. Patient has hx of short cervix, rescue Mohan's cerclage on . Patient presented to triage on  for shortening cervix, as per note, patient found to be 1/50/-3 cL 0.28 with funneling beyond the stich. EFW 615g.   PMH: Ovarian cyst, syncope   PSH: Cerclage   Allergies: Peanuts- anaphylaxis  Meds: PNV, vag. progesterone   Denies hx of anxiety, depression  denies use of eoth, drugs, tobacco during pregnancy

## 2022-12-01 NOTE — OB RN PATIENT PROFILE - FUNCTIONAL ASSESSMENT - BASIC MOBILITY SCORE.
How Severe Is Your Skin Lesion?: moderate Have Your Skin Lesions Been Treated?: not been treated Is This A New Presentation, Or A Follow-Up?: Skin Lesions 24

## 2022-12-01 NOTE — PROGRESS NOTE ADULT - ASSESSMENT
A/P: 26yo  at 23w6d whose history is sig for a exam indicated cerclage is now admitted in the setting of PPROM. VSS. Asxs. FEtal status reassuring. REported to be in transverse lie, uknown FILIPE to have sonogram today. Discussed in great detail risks of periviable delivery including  moratility, long term morbidity inclujding but not limited to sensorneuro def, blindness, cp, cog delay etc. Also maternal risks inclduing classiccal  - need for future , inc risk of hemorrage, infection, future risk of uterine dehiscence, and  delivery. With this known pt desires full interventions including classical  and fetal monitoring. Agree w/ mg gtt, bmtz, amp and continous monitoring for now. Cerclage can remain insitu if stable. Please feel free to reach out w/ any further questions      Patient seen with Dr. Esparza ([facility] attending)    Rubio Sharpe M.D. PGY-6  Maternal Fetal Medicine Fellow  Cell: [phone] if after 5pm or weekend ask labor and delivery for on call fellow

## 2022-12-01 NOTE — PROGRESS NOTE ADULT - SUBJECTIVE AND OBJECTIVE BOX
MFM Fellow Consult Note    HPI: 28yo  at 23w6d whose history is sig for a exam indicated cerclage is now admitted in the setting of PPROM. +LOF denies vb/ctx +FM. Denies fever/chills or abd pain. MFM consulted to discuss mng in setting of periviability. Pt previously counseled by her primary team and NICU and desires full interventions.      Histories  GYN: ovarian cyst - resolved  OB: G1: SAB  PMH: Ovarian cyst, syncope   PSH: Cerclage   Allergies: Peanuts- anaphylaxis  Meds: PNV, vag. progesterone   Denies hx of anxiety, depression  denies use of eoth, drugs, tobacco during pregnancy       Physical Exam  ICU Vital Signs Last 24 Hrs  T(C): 36.9 (01 Dec 2022 07:49), Max: 36.9 (01 Dec 2022 01:07)  T(F): 98.42 (01 Dec 2022 07:49), Max: 98.42 (01 Dec 2022 05:27)  HR: 83 (01 Dec 2022 10:16) (69 - 100)  BP: 124/63 (01 Dec 2022 09:41) (104/62 - 135/81)  BP(mean): --  ABP: --  ABP(mean): --  RR: 20 (01 Dec 2022 03:55) (15 - 20)  SpO2: 98% (01 Dec 2022 10:16) (92% - 99%)  Gen: well appearing  Abd: soft gravid non tender                          9.6    7.54  )-----------( 343      ( 01 Dec 2022 03:05 )             30.1       Mg     4.70

## 2022-12-01 NOTE — CHART NOTE - NSCHARTNOTEFT_GEN_A_CORE
Att:  Pt seen and evaluated by me this afternoon. PROM at 23.6 weeks. Plan reviewed with pt briefly, continue Mag sulfate x 24 hrs. Pt understands need for classical CS if need for delivery acutely arises and presentation of fetus does not change. Elizabeth Dawson MD

## 2022-12-01 NOTE — OB PROVIDER H&P - PROBLEM SELECTOR PLAN 1
plan d/w Dr. Hobbs   patient admitted for PPROM   routine orders  for BAM   NPO at this time   Quang cerclage insitu-no tension noted to [facility]   GBS positive 11/28  for NICU consult  covid pcr sent   patient educated on plan of care and verbalizes understanding   consents signed by patient

## 2022-12-01 NOTE — OB RN TRIAGE NOTE - NS_ACCOMPAINEDBY_OBGYN_ALL_OB
Significant Other Azathioprine Counseling:  I discussed with the patient the risks of azathioprine including but not limited to myelosuppression, immunosuppression, hepatotoxicity, lymphoma, and infections.  The patient understands that monitoring is required including baseline LFTs, Creatinine, possible TPMP genotyping and weekly CBCs for the first month and then every 2 weeks thereafter.  The patient verbalized understanding of the proper use and possible adverse effects of azathioprine.  All of the patient's questions and concerns were addressed.

## 2022-12-01 NOTE — OB PROVIDER TRIAGE NOTE - NS_OBGYNHISTORY_OBGYN_ALL_OB_FT
AP course complicated by short cervix   OB: sab x1 no d&c        short cervix- jesikas cerclage placed on 11/9   GYN: denies     GBS positive 11/28

## 2022-12-01 NOTE — OB PROVIDER H&P - NS ATTEND AMEND GEN_ALL_CORE FT
PPROM at 23.6wks, rescue [place] F cerclage in situ; currently no signs/sx of labor or intraamniotic infection    Discussed extreme prematurity with pt and implications of PPROM including PTL,  birth, chorioamnionitis, sepsis and need for emergent classical C/s.  Pt desires everything be done for fetus.  She welcomes a NICU consult.  All questions and concerns were addressed.

## 2022-12-01 NOTE — OB RN PATIENT PROFILE - NS_OBGYNHISTORY_OBGYN_ALL_OB_FT
Gualberto James is a 39 y.o. female who presents here today for complaints of referral from Dr Greg Agustin for evaluation for severe PMDD. Worsening with age . Patient with questions about hysterectomy with removal of ovaries to treat her PMDD. Vitals:  /70 (Site: Left Upper Arm, Position: Sitting, Cuff Size: Medium Adult)   Pulse 86   Ht 5' 4\" (1.626 m)   Wt 179 lb (81.2 kg)   LMP 2020 (Approximate)   BMI 30.73 kg/m²   Allergies:  Patient has no known allergies.   Past Medical History:   Diagnosis Date    Abnormal Pap smear of cervix     Arthritis     Depression 2019    Fibroadenoma of right breast 2019    Hyperlipidemia     meds > 10 yrs    Thyroid disease     meds > 10 yrs     Past Surgical History:   Procedure Laterality Date    BREAST BIOPSY Right 2019    RIGHT NEEDLE LOCALIZED EXCISIONAL BIOPSY performed by Erica Bazan MD at [address]      reduction    LEEP       has had x 3 procedures / atypical cells    TUBAL LIGATION      US BREAST NEEDLE BIOPSY RIGHT  10/1/2019    US BREAST NEEDLE BIOPSY RIGHT 10/1/2019 [facility] ULTRASOUND     OB History        2    Para   2    Term   2            AB        Living   2       SAB        TAB        Ectopic        Molar        Multiple        Live Births   2              Family History   Problem Relation Age of Onset   Floydene Ada Breast Cancer Mother 28         at age 37 of breast cancer    Other Father         murdered at age 35s   Floydene Ada No Known Problems Sister     No Known Problems Brother     No Known Problems Son     Cancer Neg Hx     Colon Cancer Neg Hx     Diabetes Neg Hx     Hypertension Neg Hx     Ovarian Cancer Neg Hx      Labor Neg Hx     Spont Abortions Neg Hx     Stroke Neg Hx     Eclampsia Neg Hx      Social History     Socioeconomic History    Marital status: Single     Spouse name: Not on file    Number of children: Not on file    Years of education: Not on file  Highest education level: Not on file   Occupational History    Not on file   Social Needs    Financial resource strain: Not on file    Food insecurity     Worry: Not on file     Inability: Not on file    Transportation needs     Medical: Not on file     Non-medical: Not on file   Tobacco Use    Smoking status: Former Smoker     Packs/day: 0.50     Years: 10.00     Pack years: 5.00     Types: Cigarettes     Quit date: 2018     Years since quittin.1    Smokeless tobacco: Never Used    Tobacco comment: intermittent habit   Substance and Sexual Activity    Alcohol use: Not Currently    Drug use: Never    Sexual activity: Yes     Partners: Male     Comment: Tubal Ligation   Lifestyle    Physical activity     Days per week: Not on file     Minutes per session: Not on file    Stress: Not on file   Relationships    Social connections     Talks on phone: Not on file     Gets together: Not on file     Attends Quaker service: Not on file     Active member of club or organization: Not on file     Attends meetings of clubs or organizations: Not on file     Relationship status: Not on file    Intimate partner violence     Fear of current or ex partner: Not on file     Emotionally abused: Not on file     Physically abused: Not on file     Forced sexual activity: Not on file   Other Topics Concern    Not on file   Social History Narrative    Not on file       Contraceptive method:  none    Patient's medications, allergies, past medical, surgical, social and family histories were reviewed and updated as appropriate. Review of Systems  As per chief complaint   All other systems reviewed and are negative. Mood changes around cycle.    Physical Exam:  Vitals:  /70 (Site: Left Upper Arm, Position: Sitting, Cuff Size: Medium Adult)   Pulse 86   Ht 5' 4\" (1.626 m)   Wt 179 lb (81.2 kg)   LMP 2020 (Approximate)   BMI 30.73 kg/m²   Lungs: CTAB   Heart : Regular S1/S2, no M/R/G  Abdomen: Soft , NT, ND , + BS   Pelvic exam : deferred    Assessment:      Diagnosis Orders   1. PMDD (premenstrual dysphoric disorder)     2. Menorrhalgia     3. Vaginal bleeding         Plan:     Advised patient for a trial of hormonal treatment to mitigate hormonal fluctuations arround the menstrual cycle. Trial OCPs in continous fashion . Discussed risks of Hormonal contraception. Benefits outweigh risks in patients situation. Patient to use and return in 3 [de-identified] . Or earlier as needed. No orders of the defined types were placed in this encounter. Orders Placed This Encounter   Medications    norgestimate-ethinyl estradiol (ORTHO-CYCLEN) 0.25-35 MG-MCG per tablet     Sig: Take one tablet daily continously , skip sugar pills. Dispense:  1 packet     Refill:  6     Patient was seen with total face to face time of 30 minutes. More than 50% of this visit was counseling and education regarding The primary encounter diagnosis was PMDD (premenstrual dysphoric disorder). Diagnoses of Menorrhalgia and Vaginal bleeding were also pertinent to this visit. and Menstrual Problem (PMDD. Dr. Hanson Ran patient. )   as well as  counseling on preventative health maintenance follow-up. Follow Up:  Return in about 3 months (around 3/28/2021) for medication assessment.         Sally Lubin MD AP course complicated by short cervix   OB: sab x1 no d&c        short cervix- jesikas cerclage placed on 11/9   GYN: denies     GBS positive 11/28

## 2022-12-01 NOTE — OB PROVIDER H&P - NSHPPHYSICALEXAM_GEN_ALL_CORE
Vital Signs Last 24 Hrs  T(C): 36.9 (01 Dec 2022 01:07), Max: 36.9 (01 Dec 2022 01:07)  T(F): 98.4 (01 Dec 2022 01:07), Max: 98.4 (01 Dec 2022 01:07)  HR: 80 (01 Dec 2022 01:33) (77 - 80)  BP: 109/62 (01 Dec 2022 01:33) (109/62 - 115/68)  BP(mean): --  RR: 15 (01 Dec 2022 01:07) (15 - 15)  SpO2: --    Assessment reveals VSS  Abdomen soft, NT, gravid   mod. variability no accels no decels, no contractions on toco   Transabdominal Ultrasound- Transverse presentation   Sterile Speculum- positive pooling, nitrazine positive, FERN positive, Cervix appears closed externally, cerclage stich visualized, no tension noted   Transvaginal Ultrasound -deferred   Vaginal Exam- deferred   A&Ox3  Lungs- clear bilateral  Heart- normal rate and rhythm Vital Signs Last 24 Hrs  T(C): 36.9 (01 Dec 2022 01:07), Max: 36.9 (01 Dec 2022 01:07)  T(F): 98.4 (01 Dec 2022 01:07), Max: 98.4 (01 Dec 2022 01:07)  HR: 80 (01 Dec 2022 01:33) (77 - 80)  BP: 109/62 (01 Dec 2022 01:33) (109/62 - 115/68)  BP(mean): --  RR: 15 (01 Dec 2022 01:07) (15 - 15)  SpO2: --    Assessment reveals VSS  Abdomen soft, NT, gravid   mod. variability no accels no decels, no contractions on toco   Transabdominal Ultrasound- Transverse presentation, MVP 3.27   Sterile Speculum- positive pooling, nitrazine positive, FERN positive, Cervix appears closed externally, cerclage stich visualized, no tension noted   Transvaginal Ultrasound -deferred   Vaginal Exam- deferred   A&Ox3  Lungs- clear bilateral  Heart- normal rate and rhythm

## 2022-12-01 NOTE — PROGRESS NOTE ADULT - ATTENDING COMMENTS
MFM ATTENDING ATTESTATION    Delayed entry secondary to patient care.    26yo  at 23w6d with PPROM    Issues:  1. PPROM  2. Cervical insufficiency with cerclage in situ and on vaginal progesterone  3. cerclage in situ  4. allergic to peanuts    reports lof. denies vb/ctx, reports normal fetal movements    comfortable  abd soft gravid nontender no rebound / guarding, uterus nontender  extr no calf tend / edema    wbc 9  gbs swab positive    BSUS: transverse    FHT reassuring for gestational age, irritability on toco.       A/P:   26yo  at 23w6d with PPROM  discussed issues of periviable PPROM including risk of extremely  delivery,  (antepartum, intrapartum, or ) death, survival with major neurodevelopmental deficits.   desires full intervention.  on .   no ssx chorioamnionitis or abruption at time of evaluation    betamethasone   magnesium neuroprotection x12 hours  latency abx  not verna, tocolytics not indicated   ucx, gc/ct/trichomonas  leave cerclage in situ at this time MFM ATTENDING ATTESTATION    Delayed entry secondary to patient care.    28yo  at 23w6d with PPROM    Issues:  1. PPROM  2. Cervical insufficiency with cerclage in situ and on vaginal progesterone  3. cerclage in situ  4. allergic to peanuts    reports lof. denies vb/ctx, reports normal fetal movements    comfortable  abd soft gravid nontender no rebound / guarding, uterus nontender  extr no calf tend / edema    wbc 9  gbs swab positive    BSUS: transverse    FHT reassuring for gestational age, irritability on toco.       A/P:   28yo  at 23w6d with PPROM  discussed issues of periviable PPROM including risk of extremely  delivery,  (antepartum, intrapartum, or ) death, survival with major neurodevelopmental deficits.   desires full intervention.  on .   no ssx chorioamnionitis or abruption at time of evaluation    betamethasone   magnesium neuroprotection x12 hours  latency abx  not verna, tocolytics not indicated   ucx, gc/ct/trichomonas  dc vaginal progesterone  leave cerclage in situ at this time

## 2022-12-01 NOTE — OB RN PATIENT PROFILE - TEMPERATURE IN CELSIUS (DEGREES C)
Problem: At Risk for Falls  Goal: # Patient does not fall  Outcome: Outcome Met, Continue evaluating goal progress toward completion  Patient did not fall, continue to monitor.      
36.7

## 2022-12-01 NOTE — CHART NOTE - NSCHARTNOTEFT_GEN_A_CORE
Ms. ____ is a ___ y/o G_P_ admitted at 25w__d gestational age. Maternal history notable for ____, and prenatal history notable for ______. Most recent EFW ___g on ______. NICU consulted to discuss what to expect if she were to deliver at 25 weeks gestation. NICU team met with Ms. _______ and her partner and discussed the following:    Ms. Sheldon is a 27 year old  at 23.6 presenting with PPROM in setting of cervical shortening s/p cerclage placement, currently under no tension. No signs of PTL at this moment, mother is receiving BMZ, ampicillin, magnesium. EFW 615g at 54%. s with c/o leaking of fluid since 11pm on  clear fluids. NICU consulted to discuss what to expect if she were to deliver at 23-24 weeks gestation. NICU team met with Ms. Tate and discussed the followin. The NICU team will be present at the time of delivery, and the infant will be placed under the warmer and immediately evaluated.    2. Due to extreme prematurity the infant will require respiratory support, either in the form of CPAP or intubation with mechanical ventilation. If the infant requires intubation, surfactant will be administered immediately at delivery or soon after. Due to prematurity, the infant may develop lung disease during the course of the NICU admission, referred to as bronchopulmonary dysplasia.    3. Full feedings will not be started right away. The infant would require placement of an orogastric or nasogastric tube to provide enteral feedings, and feeding volume would be advanced slowly as tolerated. IV nutrition in the form of TPN would be provided via umbilical line or PICC until the infant is able to tolerate full enteral feedings. Discussed the benefits of human milk for  infants and encouraged mother to pump following delivery.    4. Due to the extreme prematurity, the infant would be at increased risk for infection and would likely be started on antibiotics after birth. The infant will be screened for infections following delivery and may require other courses of antibiotics during their hospital course if an infection were suspected. If the infant shows signs and symptoms of feeding intolerance, feedings may be held, and the infant may require evaluation for intestinal infection (necrotizing enterocolitis).    5. The infant will be monitored for hypotension and may require vasopressor medication. The infant may also require screening for a patent ductus arteriosus, and if clinically significant, may require medical or surgical treatment.    6. The infant will develop anemia of prematurity and may require blood transfusions.    1. The infant is at increased risk for intraventricular hemorrhage (IVH) because the blood vessels in the developing brain are very fragile. This may result in significant developmental delays. Even in the absence of IVH, the infant is at risk for developmental delays as a consequence of prematurity. The infant will be evaluated by a developmental pediatrician to monitor for neurodevelopmental delays.    7. The infant is at risk for retinopathy of prematurity (ROP). Severe ROP can lead to diminished visual acuity or blindness. The infant will be examined regularly by a pediatric ophthalmologist, and if ROP is severe may require medication or eye surgery.    8. The infant is at risk for jaundice and may require phototherapy.    9. Thermoregulation issues and need to be in an isolette with slow weaning to a crib discussed.    10. Length of stay is highly variable, but given the infant’s gestational age, average stay is approximately 3mo. Discussed discharged criteria.    Ms. Sheldon had the chance to ask any questions she may have had, and was encouraged to contact the NICU at that time if additional questions arise.    Thank you for the opportunity to participate in the care of this patient and please inform us of any changes in her status.    Connor Del Toro MD  NICU Fellow PGY-5

## 2022-12-01 NOTE — OB PROVIDER H&P - NS_SPECEXAM_OBGYN_ALL_OB
<<-----Click on this checkbox to enter Procedure Jazmine  09/18/2017    [facility]

## 2022-12-01 NOTE — OB PROVIDER H&P - NSLOWPPHRISK_OBGYN_A_OB
No previous uterine incision/[place] Pregnancy/Less than or equal to 4 previous vaginal births/No known bleeding disorder/No history of postpartum hemorrhage/No other PPH risks indicated

## 2022-12-01 NOTE — OB RN TRIAGE NOTE - INTERNATIONAL TRAVEL
I called and spoke with the patient and gave her Dr. Nava's results and advised her to  her Rx.   No

## 2022-12-02 PROCEDURE — 99233 SBSQ HOSP IP/OBS HIGH 50: CPT | Mod: GC

## 2022-12-02 RX ORDER — AMOXICILLIN 250 MG/5ML
500 SUSPENSION, RECONSTITUTED, ORAL (ML) ORAL EVERY 8 HOURS
Refills: 0 | Status: COMPLETED | OUTPATIENT
Start: 2022-12-02 | End: 2022-12-07

## 2022-12-02 RX ORDER — HEPARIN SODIUM 5000 [USP'U]/ML
5000 INJECTION INTRAVENOUS; SUBCUTANEOUS EVERY 12 HOURS
Refills: 0 | Status: DISCONTINUED | OUTPATIENT
Start: 2022-12-02 | End: 2022-12-11

## 2022-12-02 RX ADMIN — Medication 500 MILLIGRAM(S): at 11:41

## 2022-12-02 RX ADMIN — Medication 200 GRAM(S): at 11:37

## 2022-12-02 RX ADMIN — HEPARIN SODIUM 5000 UNIT(S): 5000 INJECTION INTRAVENOUS; SUBCUTANEOUS at 18:10

## 2022-12-02 RX ADMIN — Medication 1 TABLET(S): at 11:36

## 2022-12-02 RX ADMIN — Medication 200 GRAM(S): at 05:01

## 2022-12-02 RX ADMIN — Medication 12 MILLIGRAM(S): at 03:30

## 2022-12-02 RX ADMIN — Medication 325 MILLIGRAM(S): at 11:36

## 2022-12-02 RX ADMIN — Medication 200 GRAM(S): at 17:10

## 2022-12-02 RX ADMIN — Medication 200 GRAM(S): at 23:00

## 2022-12-02 NOTE — PROGRESS NOTE ADULT - ATTENDING COMMENTS
Patient seen at bedside, no acute events overnight.  Denies fever, chills, nausea or vomiting. Denies ctx, vb, further LOF and feels good FM.  On latency abx, s/p mag and BMZ.  Continue inpatient monitoring.    Nando Blair MD

## 2022-12-02 NOTE — PROGRESS NOTE ADULT - SUBJECTIVE AND OBJECTIVE BOX
R3 Antepartum Note,       Patient seen and examined at bedside, no acute overnight events. No acute complaints. Pt reports +FM, denies LOF, VB, ctx, HA, epigastric pain, blurred vision, CP, SOB, N/V, fevers, and chills.    Vital Signs Last 24 Hours  T(C): 36.3 (12-02-22 @ 05:24), Max: 36.9 (12-01-22 @ 07:49)  HR: 72 (12-02-22 @ 05:24) (70 - 100)  BP: 109/66 (12-02-22 @ 05:24) (104/64 - 135/81)  RR: 18 (12-02-22 @ 05:24) (12 - 18)  SpO2: 99% (12-02-22 @ 05:24) (92% - 100%)    CAPILLARY BLOOD GLUCOSE          Physical Exam:  General: NAD  Abdomen: Soft, non-tender, gravid  Ext: No pain or swelling    Labs:             9.6    7.54  )-----------( 343      ( 12-01 @ 03:05 )             30.1       Mg     5.80     12-01 @ 22:10              MEDICATIONS  (STANDING):  amoxicillin 500 milliGRAM(s) Oral every 8 hours  ampicillin  IVPB 2 Gram(s) IV Intermittent every 6 hours  ascorbic acid 500 milliGRAM(s) Oral daily  betamethasone Injectable 12 milliGRAM(s) IntraMuscular every 24 hours  ferrous    sulfate 325 milliGRAM(s) Oral daily  lactated ringers 1000 milliLiter(s) (75 mL/Hr) IV Continuous <Continuous>  prenatal multivitamin 1 Tablet(s) Oral daily    MEDICATIONS  (PRN):

## 2022-12-02 NOTE — PROGRESS NOTE ADULT - ASSESSMENT
A/P: 28yo  at 24w0d with exam indicated cerclage admitted for management of PPROM. No evidence of chorio, PTL or abruption at this time. Patient stable and doing well.     #PPROM  - Ampicillin for 48 hours & Azithromycin x1 for latency, Amoxicillin for 5 days following Ampicillin  - BMZ for FLM (- )  - NST BID  - s/p Mag for fetal neuroprotection  - Mohan cerclage to remain in situ    #Fetal wellbeing  - NST BID  - NICU consult  - GBS (): pos    #Maternal wellbeing  - Regular diet  - HSQ/SCDs for DVT ppx  - PNV  - f/u UCx 912/10    Cecilia Hernandez, PGY-3

## 2022-12-03 ENCOUNTER — TRANSCRIPTION ENCOUNTER (OUTPATIENT)
Age: 27
End: 2022-12-03

## 2022-12-03 RX ADMIN — Medication 500 MILLIGRAM(S): at 06:03

## 2022-12-03 RX ADMIN — HEPARIN SODIUM 5000 UNIT(S): 5000 INJECTION INTRAVENOUS; SUBCUTANEOUS at 06:05

## 2022-12-03 RX ADMIN — Medication 500 MILLIGRAM(S): at 22:01

## 2022-12-03 RX ADMIN — Medication 1 TABLET(S): at 10:11

## 2022-12-03 RX ADMIN — Medication 500 MILLIGRAM(S): at 10:11

## 2022-12-03 RX ADMIN — Medication 325 MILLIGRAM(S): at 10:11

## 2022-12-03 RX ADMIN — Medication 500 MILLIGRAM(S): at 13:43

## 2022-12-03 RX ADMIN — HEPARIN SODIUM 5000 UNIT(S): 5000 INJECTION INTRAVENOUS; SUBCUTANEOUS at 22:01

## 2022-12-03 NOTE — DISCHARGE NOTE OB - PLAN OF CARE
as above normal activity, regular diet, follow up in office in 6 weeks After discharge, please stay on pelvic rest for 6 weeks, meaning no sexual intercourse, no tampons and no douching. Expect to have vaginal bleeding/spotting for up to six weeks.  The bleeding should get lighter and more white/light brown with time.  For bleeding soaking more than a pad an hour or passing clots greater than the size of your fist, come in to the emergency department

## 2022-12-03 NOTE — DISCHARGE NOTE OB - MEDICATION SUMMARY - MEDICATIONS TO STOP TAKING
I will STOP taking the medications listed below when I get home from the hospital:    indomethacin 25 mg oral capsule  -- 1 cap(s) by mouth every 6 hours MDD:4 tablets per day  -- Do not take aspirin or aspirin containing products without knowledge and consent of your physician.  It is very important that you take or use this exactly as directed.  Do not skip doses or discontinue unless directed by your doctor.  May cause drowsiness or dizziness.  Obtain medical advice before taking any non-prescription drugs as some may affect the action of this medication.  Take with food or milk.    progesterone 200 mg vaginal suppository  -- 1 suppository(ies) vaginally once day

## 2022-12-03 NOTE — DISCHARGE NOTE OB - CARE PROVIDER_API CALL
Charline Hobbs (MD)  Obstetrics and Gynecology  [facility] [place] [address]  Phone: [phone]  Fax: [phone]  Follow Up Time:

## 2022-12-03 NOTE — DISCHARGE NOTE OB - NS MD DC FALL RISK RISK
For information on Fall & Injury Prevention, visit: [email or web address] OR  [email or web address] OR  https://www.cdc.gov/steadi/patient.html

## 2022-12-03 NOTE — PROGRESS NOTE ADULT - ASSESSMENT
A/P: 26yo  at 24w1d with exam indicated cerclage admitted for management of PPROM@23w6d. No evidence of chorioamnionitis, PTL or abruption at this time. Patient stable and doing well.     #PPROM  - Ampicillin for 48 hours & Azithromycin x1 for latency, Amoxicillin for 5 days following Ampicillin  - NST BID  - s/p BMZ for FLM (-)  - s/p Mag for fetal neuroprotection  - Mohan cerclage to remain in situ    #Fetal wellbeing  - NST BID  - Appreciate NICU consult  - GBS (): pos    #Maternal wellbeing  - Regular diet  - HSQ/SCDs for DVT ppx  - PNV    Aaliyah Hwang PGY3

## 2022-12-03 NOTE — DISCHARGE NOTE OB - PATIENT PORTAL LINK FT
You can access the FollowMyHealth Patient Portal offered by [facility] by registering at the following website: [email or web address]. By joining [facility]’s FollowMyHealth portal, you will also be able to view your health information using other applications (apps) compatible with our system.

## 2022-12-03 NOTE — DISCHARGE NOTE OB - MEDICATION SUMMARY - MEDICATIONS TO TAKE
I will START or STAY ON the medications listed below when I get home from the hospital:    ibuprofen 600 mg oral tablet  -- 1 tab(s) by mouth every 6 hours  -- Indication: For  premature rupture of membranes (PPROM) with unknown onset of labor   I will START or STAY ON the medications listed below when I get home from the hospital:    ibuprofen 600 mg oral tablet  -- 1 tab(s) by mouth every 6 hours  -- Indication: For  premature rupture of membranes (PPROM) with unknown onset of labor    Alivio 600 mg oral tablet  -- 1 tab(s) by mouth every 6 hours   -- Do not take this drug if you are pregnant.  It is very important that you take or use this exactly as directed.  Do not skip doses or discontinue unless directed by your doctor.  May cause drowsiness or dizziness.  Obtain medical advice before taking any non-prescription drugs as some may affect the action of this medication.  Take with food or milk.    -- Indication: For Pain

## 2022-12-03 NOTE — PROGRESS NOTE ADULT - SUBJECTIVE AND OBJECTIVE BOX
R3 Antepartum Note    HD#3    Patient seen and examined at bedside, no acute overnight events. No acute complaints. Pt reports +FM, denies LOF, VB, ctx, HA, epigastric pain, blurred vision, CP, SOB, N/V, fevers, and chills.    Vital Signs Last 24 Hours  T(C): 36.5 (12-03-22 @ 06:10), Max: 36.7 (12-02-22 @ 14:31)  HR: 71 (12-03-22 @ 06:10) (69 - 86)  BP: 104/59 (12-03-22 @ 06:10) (104/59 - 115/66)  RR: 17 (12-03-22 @ 06:10) (17 - 18)  SpO2: 96% (12-03-22 @ 06:10) (95% - 98%)    CAPILLARY BLOOD GLUCOSE          Physical Exam:  General: NAD  Abdomen: Soft, non-tender, gravid  Ext: No pain or swelling    NST reactive overnight    Labs:    Mg     5.80     12-01 @ 22:10              MEDICATIONS  (STANDING):  amoxicillin 500 milliGRAM(s) Oral every 8 hours  ascorbic acid 500 milliGRAM(s) Oral daily  ferrous    sulfate 325 milliGRAM(s) Oral daily  heparin   Injectable 5000 Unit(s) SubCutaneous every 12 hours  prenatal multivitamin 1 Tablet(s) Oral daily    MEDICATIONS  (PRN):

## 2022-12-03 NOTE — DISCHARGE NOTE OB - HOSPITAL COURSE
26yo  with exam indicated cerclage admitted for management of PPROM@23w6d. She was started on latency antibiotics, magnesium and given betamethasone. Throughout her antepartum stay, she had no evidence of chorioamnionitis, PTL or abruption at this time. she then underwent **** 28yo  with exam indicated cerclage admitted for management of PPROM@23w6d. She was started on latency antibiotics, magnesium and given betamethasone. Throughout her antepartum stay, she had no evidence of chorioamnionitis, PTL or abruption at this time. she then went into spontaneous labor on 22, , was taken for cerclage removal , and then ultimately taken for classical  section secondary to a category 3 tracing at 3cm dilation  pt did well subsequently and was discharged home

## 2022-12-03 NOTE — DISCHARGE NOTE OB - CARE PLAN
Principal Discharge DX:	Abnormality in fetal heart rate/rhythm, delivered  Assessment and plan of treatment:	normal activity, regular diet, follow up in office in 6 weeks  Secondary Diagnosis:	 premature rupture of membranes, delivered, current hospitalization  Assessment and plan of treatment:	as above   1 Principal Discharge DX:	Abnormality in fetal heart rate/rhythm, delivered  Assessment and plan of treatment:	After discharge, please stay on pelvic rest for 6 weeks, meaning no sexual intercourse, no tampons and no douching. Expect to have vaginal bleeding/spotting for up to six weeks.  The bleeding should get lighter and more white/light brown with time.  For bleeding soaking more than a pad an hour or passing clots greater than the size of your fist, come in to the emergency department  Secondary Diagnosis:	 premature rupture of membranes, delivered, current hospitalization  Assessment and plan of treatment:	as above

## 2022-12-03 NOTE — DISCHARGE NOTE OB - NS OB DC MMR REASON NOT RECEIVED
Patient received discharge instructions at 1230, including medication regimen, discharge instructions, site care, and follow-up appointments, as written in the after visit summary. No further questions or concerns at this time. Patient left [address] via wheelchair t to meet family down at the front entrance who will drive patient home. Patient sent home with all belongings and medications.    Contraindicated

## 2022-12-04 LAB
BASOPHILS # BLD AUTO: 0 K/UL — SIGNIFICANT CHANGE UP (ref 0–0.2)
BASOPHILS NFR BLD AUTO: 0 % — SIGNIFICANT CHANGE UP (ref 0–2)
BLD GP AB SCN SERPL QL: NEGATIVE — SIGNIFICANT CHANGE UP
EOSINOPHIL # BLD AUTO: 0 K/UL — SIGNIFICANT CHANGE UP (ref 0–0.5)
EOSINOPHIL NFR BLD AUTO: 0 % — SIGNIFICANT CHANGE UP (ref 0–6)
HCT VFR BLD CALC: 28.2 % — LOW (ref 34.5–45)
HGB BLD-MCNC: 8.9 G/DL — LOW (ref 11.5–15.5)
IANC: 6.87 K/UL — SIGNIFICANT CHANGE UP (ref 1.8–7.4)
LYMPHOCYTES # BLD AUTO: 2.57 K/UL — SIGNIFICANT CHANGE UP (ref 1–3.3)
LYMPHOCYTES # BLD AUTO: 23.5 % — SIGNIFICANT CHANGE UP (ref 13–44)
MANUAL SMEAR VERIFICATION: SIGNIFICANT CHANGE UP
MCHC RBC-ENTMCNC: 26.8 PG — LOW (ref 27–34)
MCHC RBC-ENTMCNC: 31.6 GM/DL — LOW (ref 32–36)
MCV RBC AUTO: 84.9 FL — SIGNIFICANT CHANGE UP (ref 80–100)
METAMYELOCYTES # FLD: 1.7 % — HIGH (ref 0–1)
MONOCYTES # BLD AUTO: 0.28 K/UL — SIGNIFICANT CHANGE UP (ref 0–0.9)
MONOCYTES NFR BLD AUTO: 2.6 % — SIGNIFICANT CHANGE UP (ref 2–14)
MYELOCYTES NFR BLD: 0.9 % — HIGH (ref 0–0)
NEUTROPHILS # BLD AUTO: 7.61 K/UL — HIGH (ref 1.8–7.4)
NEUTROPHILS NFR BLD AUTO: 69.6 % — SIGNIFICANT CHANGE UP (ref 43–77)
NRBC # BLD: 2 /100 — HIGH (ref 0–0)
PLAT MORPH BLD: NORMAL — SIGNIFICANT CHANGE UP
PLATELET # BLD AUTO: 362 K/UL — SIGNIFICANT CHANGE UP (ref 150–400)
PLATELET COUNT - ESTIMATE: NORMAL — SIGNIFICANT CHANGE UP
RBC # BLD: 3.32 M/UL — LOW (ref 3.8–5.2)
RBC # FLD: 13.4 % — SIGNIFICANT CHANGE UP (ref 10.3–14.5)
RBC BLD AUTO: NORMAL — SIGNIFICANT CHANGE UP
RH IG SCN BLD-IMP: POSITIVE — SIGNIFICANT CHANGE UP
VARIANT LYMPHS # BLD: 1.7 % — SIGNIFICANT CHANGE UP (ref 0–6)
WBC # BLD: 10.93 K/UL — HIGH (ref 3.8–10.5)
WBC # FLD AUTO: 10.93 K/UL — HIGH (ref 3.8–10.5)

## 2022-12-04 RX ADMIN — Medication 325 MILLIGRAM(S): at 10:03

## 2022-12-04 RX ADMIN — HEPARIN SODIUM 5000 UNIT(S): 5000 INJECTION INTRAVENOUS; SUBCUTANEOUS at 21:44

## 2022-12-04 RX ADMIN — Medication 500 MILLIGRAM(S): at 21:44

## 2022-12-04 RX ADMIN — Medication 500 MILLIGRAM(S): at 10:02

## 2022-12-04 RX ADMIN — Medication 500 MILLIGRAM(S): at 13:51

## 2022-12-04 RX ADMIN — HEPARIN SODIUM 5000 UNIT(S): 5000 INJECTION INTRAVENOUS; SUBCUTANEOUS at 10:02

## 2022-12-04 RX ADMIN — Medication 1 TABLET(S): at 10:02

## 2022-12-04 RX ADMIN — Medication 500 MILLIGRAM(S): at 06:01

## 2022-12-04 NOTE — PROGRESS NOTE ADULT - SUBJECTIVE AND OBJECTIVE BOX
R3 Antepartum Note,       Patient seen and examined at bedside, no acute overnight events. No acute complaints. Pt reports +FM, denies LOF, VB, ctx, HA, epigastric pain, blurred vision, CP, SOB, N/V, fevers, and chills.    Vital Signs Last 24 Hours  T(C): 36.9 (12-04-22 @ 01:33), Max: 37.1 (12-03-22 @ 13:41)  HR: 85 (12-04-22 @ 01:33) (71 - 85)  BP: 111/65 (12-04-22 @ 01:33) (104/59 - 118/65)  RR: 17 (12-04-22 @ 01:33) (17 - 18)  SpO2: 96% (12-04-22 @ 01:33) (95% - 99%)    CAPILLARY BLOOD GLUCOSE          Physical Exam:  General: NAD  Abdomen: Soft, non-tender, gravid  Ext: No pain or swelling    Labs:                MEDICATIONS  (STANDING):  amoxicillin 500 milliGRAM(s) Oral every 8 hours  ascorbic acid 500 milliGRAM(s) Oral daily  ferrous    sulfate 325 milliGRAM(s) Oral daily  heparin   Injectable 5000 Unit(s) SubCutaneous every 12 hours  prenatal multivitamin 1 Tablet(s) Oral daily    MEDICATIONS  (PRN):

## 2022-12-04 NOTE — PROGRESS NOTE ADULT - ASSESSMENT
A/P: 28yo  at 24w2d with exam indicated cerclage admitted for management of PPROM@23w6d. No evidence of chorioamnionitis, PTL or abruption at this time. Patient stable and doing well.     #PPROM  - Ampicillin for 48 hours & Azithromycin x1 for latency, Amoxicillin for 5 days following Ampicillin  - NST BID  - s/p BMZ for FLM (-)  - s/p Mag for fetal neuroprotection  - Mohan cerclage to remain in situ    #Fetal wellbeing  - NST BID  - Appreciate NICU consult  - GBS (): pos    #Maternal wellbeing  - Regular diet  - HSQ/SCDs for DVT ppx  - PNV    Cecilia Hernandez, PGY-3

## 2022-12-05 ENCOUNTER — ASOB RESULT (OUTPATIENT)
Age: 27
End: 2022-12-05

## 2022-12-05 ENCOUNTER — APPOINTMENT (OUTPATIENT)
Dept: ANTEPARTUM | Facility: CLINIC | Age: 27
End: 2022-12-05

## 2022-12-05 ENCOUNTER — OUTPATIENT (OUTPATIENT)
Dept: OUTPATIENT SERVICES | Facility: HOSPITAL | Age: 27
LOS: 1 days | End: 2022-12-05

## 2022-12-05 DIAGNOSIS — O09.299 SUPERVISION OF PREGNANCY WITH OTHER POOR REPRODUCTIVE OR OBSTETRIC HISTORY, UNSPECIFIED TRIMESTER: Chronic | ICD-10-CM

## 2022-12-05 LAB
ALBUMIN SERPL ELPH-MCNC: 4.1 G/DL — SIGNIFICANT CHANGE UP (ref 3.3–5)
ALP SERPL-CCNC: 69 U/L — SIGNIFICANT CHANGE UP (ref 40–120)
ALT FLD-CCNC: 73 U/L — HIGH (ref 4–33)
ANION GAP SERPL CALC-SCNC: 13 MMOL/L — SIGNIFICANT CHANGE UP (ref 7–14)
AST SERPL-CCNC: 28 U/L — SIGNIFICANT CHANGE UP (ref 4–32)
BILIRUB SERPL-MCNC: <0.2 MG/DL — SIGNIFICANT CHANGE UP (ref 0.2–1.2)
BUN SERPL-MCNC: 8 MG/DL — SIGNIFICANT CHANGE UP (ref 7–23)
CALCIUM SERPL-MCNC: 10 MG/DL — SIGNIFICANT CHANGE UP (ref 8.4–10.5)
CHLORIDE SERPL-SCNC: 99 MMOL/L — SIGNIFICANT CHANGE UP (ref 98–107)
CO2 SERPL-SCNC: 25 MMOL/L — SIGNIFICANT CHANGE UP (ref 22–31)
CREAT SERPL-MCNC: 0.67 MG/DL — SIGNIFICANT CHANGE UP (ref 0.5–1.3)
EGFR: 124 ML/MIN/1.73M2 — SIGNIFICANT CHANGE UP
FERRITIN SERPL-MCNC: 167 NG/ML — HIGH (ref 15–150)
GLUCOSE SERPL-MCNC: 90 MG/DL — SIGNIFICANT CHANGE UP (ref 70–99)
POTASSIUM SERPL-MCNC: 4.2 MMOL/L — SIGNIFICANT CHANGE UP (ref 3.5–5.3)
POTASSIUM SERPL-SCNC: 4.2 MMOL/L — SIGNIFICANT CHANGE UP (ref 3.5–5.3)
PROT SERPL-MCNC: 7.7 G/DL — SIGNIFICANT CHANGE UP (ref 6–8.3)
SODIUM SERPL-SCNC: 137 MMOL/L — SIGNIFICANT CHANGE UP (ref 135–145)

## 2022-12-05 PROCEDURE — 99232 SBSQ HOSP IP/OBS MODERATE 35: CPT | Mod: GC

## 2022-12-05 PROCEDURE — 76816 OB US FOLLOW-UP PER FETUS: CPT | Mod: 26

## 2022-12-05 PROCEDURE — 76820 UMBILICAL ARTERY ECHO: CPT | Mod: 26,59

## 2022-12-05 RX ADMIN — Medication 1 TABLET(S): at 10:09

## 2022-12-05 RX ADMIN — Medication 500 MILLIGRAM(S): at 21:16

## 2022-12-05 RX ADMIN — Medication 325 MILLIGRAM(S): at 10:09

## 2022-12-05 RX ADMIN — Medication 500 MILLIGRAM(S): at 14:18

## 2022-12-05 RX ADMIN — HEPARIN SODIUM 5000 UNIT(S): 5000 INJECTION INTRAVENOUS; SUBCUTANEOUS at 10:09

## 2022-12-05 RX ADMIN — Medication 500 MILLIGRAM(S): at 06:13

## 2022-12-05 RX ADMIN — HEPARIN SODIUM 5000 UNIT(S): 5000 INJECTION INTRAVENOUS; SUBCUTANEOUS at 21:16

## 2022-12-05 RX ADMIN — Medication 500 MILLIGRAM(S): at 10:09

## 2022-12-05 NOTE — PROGRESS NOTE ADULT - SUBJECTIVE AND OBJECTIVE BOX
R3 Antepartum Note,       Patient seen and examined at bedside, no acute overnight events. No acute complaints. Pt reports +FM, denies LOF, VB, ctx, HA, epigastric pain, blurred vision, CP, SOB, N/V, fevers, and chills.    Vital Signs Last 24 Hours  T(C): 36.7 (12-05-22 @ 05:55), Max: 37.2 (12-04-22 @ 13:52)  HR: 73 (12-05-22 @ 05:55) (69 - 96)  BP: 107/62 (12-05-22 @ 05:55) (102/56 - 127/73)  RR: 17 (12-05-22 @ 05:55) (16 - 18)  SpO2: 95% (12-05-22 @ 05:55) (93% - 99%)    CAPILLARY BLOOD GLUCOSE          Physical Exam:  General: NAD  Abdomen: Soft, non-tender, gravid  Ext: No pain or swelling    Labs:             8.9    10.93 )-----------( 362      ( 12-04 @ 06:02 )             28.2                   MEDICATIONS  (STANDING):  amoxicillin 500 milliGRAM(s) Oral every 8 hours  ascorbic acid 500 milliGRAM(s) Oral daily  ferrous    sulfate 325 milliGRAM(s) Oral daily  heparin   Injectable 5000 Unit(s) SubCutaneous every 12 hours  prenatal multivitamin 1 Tablet(s) Oral daily    MEDICATIONS  (PRN):

## 2022-12-05 NOTE — PROGRESS NOTE ADULT - ASSESSMENT
A/P: 26yo  at 24w3d with exam indicated cerclage admitted for management of PPROM@23w6d. No evidence of chorioamnionitis, PTL or abruption at this time. Patient stable and doing well.     #PPROM  - Ampicillin for 48 hours & Azithromycin x1 for latency, Amoxicillin for 5 days following Ampicillin  - NST BID  - s/p BMZ for FLM (-)  - s/p Mag for fetal neuroprotection  - Mohan cerclage to remain in situ    #Fetal wellbeing  - NST BID  - Appreciate NICU consult  - GBS (): pos    #Maternal wellbeing  - Regular diet  - HSQ/SCDs for DVT ppx  - PNV    Cecilia Hernandez, PGY-3

## 2022-12-06 ENCOUNTER — APPOINTMENT (OUTPATIENT)
Dept: ANTEPARTUM | Facility: CLINIC | Age: 27
End: 2022-12-06

## 2022-12-06 ENCOUNTER — ASOB RESULT (OUTPATIENT)
Age: 27
End: 2022-12-06

## 2022-12-06 ENCOUNTER — OUTPATIENT (OUTPATIENT)
Dept: OUTPATIENT SERVICES | Facility: HOSPITAL | Age: 27
LOS: 1 days | End: 2022-12-06

## 2022-12-06 DIAGNOSIS — O09.299 SUPERVISION OF PREGNANCY WITH OTHER POOR REPRODUCTIVE OR OBSTETRIC HISTORY, UNSPECIFIED TRIMESTER: Chronic | ICD-10-CM

## 2022-12-06 LAB
BASOPHILS # BLD AUTO: 0.05 K/UL — SIGNIFICANT CHANGE UP (ref 0–0.2)
BASOPHILS NFR BLD AUTO: 0.3 % — SIGNIFICANT CHANGE UP (ref 0–2)
EOSINOPHIL # BLD AUTO: 0.07 K/UL — SIGNIFICANT CHANGE UP (ref 0–0.5)
EOSINOPHIL NFR BLD AUTO: 0.4 % — SIGNIFICANT CHANGE UP (ref 0–6)
HCT VFR BLD CALC: 29.5 % — LOW (ref 34.5–45)
HGB BLD-MCNC: 9.5 G/DL — LOW (ref 11.5–15.5)
IANC: 12.83 K/UL — HIGH (ref 1.8–7.4)
IMM GRANULOCYTES NFR BLD AUTO: 3 % — HIGH (ref 0–0.9)
LYMPHOCYTES # BLD AUTO: 13.8 % — SIGNIFICANT CHANGE UP (ref 13–44)
LYMPHOCYTES # BLD AUTO: 2.34 K/UL — SIGNIFICANT CHANGE UP (ref 1–3.3)
MCHC RBC-ENTMCNC: 26.7 PG — LOW (ref 27–34)
MCHC RBC-ENTMCNC: 32.2 GM/DL — SIGNIFICANT CHANGE UP (ref 32–36)
MCV RBC AUTO: 82.9 FL — SIGNIFICANT CHANGE UP (ref 80–100)
MONOCYTES # BLD AUTO: 1.17 K/UL — HIGH (ref 0–0.9)
MONOCYTES NFR BLD AUTO: 6.9 % — SIGNIFICANT CHANGE UP (ref 2–14)
NEUTROPHILS # BLD AUTO: 12.83 K/UL — HIGH (ref 1.8–7.4)
NEUTROPHILS NFR BLD AUTO: 75.6 % — SIGNIFICANT CHANGE UP (ref 43–77)
NRBC # BLD: 0 /100 WBCS — SIGNIFICANT CHANGE UP (ref 0–0)
NRBC # FLD: 0.04 K/UL — HIGH (ref 0–0)
PLATELET # BLD AUTO: 345 K/UL — SIGNIFICANT CHANGE UP (ref 150–400)
RBC # BLD: 3.56 M/UL — LOW (ref 3.8–5.2)
RBC # FLD: 13.6 % — SIGNIFICANT CHANGE UP (ref 10.3–14.5)
WBC # BLD: 16.97 K/UL — HIGH (ref 3.8–10.5)
WBC # FLD AUTO: 16.97 K/UL — HIGH (ref 3.8–10.5)

## 2022-12-06 PROCEDURE — 76820 UMBILICAL ARTERY ECHO: CPT | Mod: 26

## 2022-12-06 PROCEDURE — 76815 OB US LIMITED FETUS(S): CPT | Mod: 26

## 2022-12-06 PROCEDURE — 99233 SBSQ HOSP IP/OBS HIGH 50: CPT | Mod: GC

## 2022-12-06 RX ADMIN — Medication 325 MILLIGRAM(S): at 11:53

## 2022-12-06 RX ADMIN — HEPARIN SODIUM 5000 UNIT(S): 5000 INJECTION INTRAVENOUS; SUBCUTANEOUS at 21:51

## 2022-12-06 RX ADMIN — Medication 1 TABLET(S): at 11:53

## 2022-12-06 RX ADMIN — Medication 500 MILLIGRAM(S): at 21:51

## 2022-12-06 RX ADMIN — Medication 500 MILLIGRAM(S): at 11:53

## 2022-12-06 RX ADMIN — HEPARIN SODIUM 5000 UNIT(S): 5000 INJECTION INTRAVENOUS; SUBCUTANEOUS at 11:53

## 2022-12-06 RX ADMIN — Medication 500 MILLIGRAM(S): at 06:23

## 2022-12-06 RX ADMIN — Medication 500 MILLIGRAM(S): at 13:52

## 2022-12-06 NOTE — PROGRESS NOTE ADULT - SUBJECTIVE AND OBJECTIVE BOX
R3 Antepartum Note,       Patient seen and examined at bedside, no acute overnight events. No acute complaints. Pt reports +FM, denies LOF, VB, ctx, HA, epigastric pain, blurred vision, CP, SOB, N/V, fevers, and chills.    Vital Signs Last 24 Hours  T(C): 37 (12-06-22 @ 05:47), Max: 37 (12-06-22 @ 05:47)  HR: 90 (12-06-22 @ 05:47) (87 - 98)  BP: 121/70 (12-06-22 @ 05:47) (106/62 - 122/71)  RR: 18 (12-06-22 @ 05:47) (15 - 18)  SpO2: 98% (12-06-22 @ 05:47) (83% - 100%)    CAPILLARY BLOOD GLUCOSE          Physical Exam:  General: NAD  Abdomen: Soft, non-tender, gravid  Ext: No pain or swelling    Labs:  12-05 @ 19:00    137  |  99  |  8   ----------------------------<  90  4.2   |  25  |  0.67    Ca    10.0      12-05 @ 19:00    TPro  7.7  /  Alb  4.1  /  TBili  <0.2  /  DBili  x   /  AST  28  /  ALT  73  /  AlkPhos  69  12-05 @ 19:00            MEDICATIONS  (STANDING):  amoxicillin 500 milliGRAM(s) Oral every 8 hours  ascorbic acid 500 milliGRAM(s) Oral daily  ferrous    sulfate 325 milliGRAM(s) Oral daily  heparin   Injectable 5000 Unit(s) SubCutaneous every 12 hours  prenatal multivitamin 1 Tablet(s) Oral daily    MEDICATIONS  (PRN):

## 2022-12-06 NOTE — PROGRESS NOTE ADULT - ATTENDING COMMENTS
Patient seen at bedside, no acute events overnight.  Denies fever, chills, ctx, LOF, vb and reports good FM.  S/p mag sulfate, BMZ. On latency abx.  Continue inpatient management of PPROM, will try to get to 34 weeks for delivery.    Nando Blair MD

## 2022-12-06 NOTE — CHART NOTE - NSCHARTNOTEFT_GEN_A_CORE
PA Note    On M rounds, noted that EFM baseline was slowly increasing day by day. CBC with diff was sent to lab. WBC noted to be 16.97 from 6.87. Neutrophils are 12.83 from 7.61. Patient denies feeling feverish, chills, abdominal pain, contractions, increased discharge, vaginal bleeding, dysuria, hematuria.    ICU Vital Signs Last 24 Hrs  T(C): 36.7 (06 Dec 2022 10:02), Max: 37 (06 Dec 2022 05:47)  T(F): 98 (06 Dec 2022 10:02), Max: 98.6 (06 Dec 2022 05:47)  HR: 104 (06 Dec 2022 10:02) (88 - 109)  BP: 119/76 (06 Dec 2022 10:02) (106/62 - 122/71)  BP(mean): --  ABP: --  ABP(mean): --  RR: 16 (06 Dec 2022 10:02) (16 - 18)  SpO2: 96% (06 Dec 2022 10:02) (83% - 99%)    O2 Parameters below as of 06 Dec 2022 10:02  Patient On (Oxygen Delivery Method): room air    Physical Exam:  General: NAD, well appearing  Abdomen: Soft, gravid, NT, NR, NG  Extremities: No calf tenderness b/l    Assessment:  28yo  @24w4d admitted for PPROM @23w6d with Mohan cerclage in situ. Currently on latency antibiotics. Vitals stable.    Plan:  -Monitor for s/sx of chorioamnionitis  -NST BID   -Monitor vitals  -MOD: IOL @34 wks unless otherwise indicated    D/w Dr. Del Valle ([facility])    Sparkle Jackson PA-C

## 2022-12-06 NOTE — PROGRESS NOTE ADULT - ASSESSMENT
A/P: 28yo  at 24w4d with exam indicated cerclage admitted for management of PPROM@23w6d. No evidence of chorioamnionitis, PTL or abruption at this time. Patient stable and doing well.     #PPROM  - Ampicillin for 48 hours & Azithromycin x1 for latency, Amoxicillin for 5 days following Ampicillin  - NST BID  - s/p BMZ for FLM (-)  - s/p Mag for fetal neuroprotection  - Mohan cerclage to remain in situ    #Fetal wellbeing  - NST BID  - Appreciate NICU consult  - GBS (): pos    #Maternal wellbeing  - Regular diet  - HSQ/SCDs for DVT ppx  - PNV    Cecilia Hernandez, PGY-3

## 2022-12-07 ENCOUNTER — NON-APPOINTMENT (OUTPATIENT)
Age: 27
End: 2022-12-07

## 2022-12-07 ENCOUNTER — APPOINTMENT (OUTPATIENT)
Dept: OBGYN | Facility: CLINIC | Age: 27
End: 2022-12-07

## 2022-12-07 LAB
BLD GP AB SCN SERPL QL: NEGATIVE — SIGNIFICANT CHANGE UP
HCT VFR BLD CALC: 32 % — LOW (ref 34.5–45)
HGB BLD-MCNC: 10.3 G/DL — LOW (ref 11.5–15.5)
MCHC RBC-ENTMCNC: 26.6 PG — LOW (ref 27–34)
MCHC RBC-ENTMCNC: 32.2 GM/DL — SIGNIFICANT CHANGE UP (ref 32–36)
MCV RBC AUTO: 82.7 FL — SIGNIFICANT CHANGE UP (ref 80–100)
NRBC # BLD: 0 /100 WBCS — SIGNIFICANT CHANGE UP (ref 0–0)
NRBC # FLD: 0 K/UL — SIGNIFICANT CHANGE UP (ref 0–0)
PLATELET # BLD AUTO: 393 K/UL — SIGNIFICANT CHANGE UP (ref 150–400)
RBC # BLD: 3.87 M/UL — SIGNIFICANT CHANGE UP (ref 3.8–5.2)
RBC # FLD: 13.7 % — SIGNIFICANT CHANGE UP (ref 10.3–14.5)
RH IG SCN BLD-IMP: POSITIVE — SIGNIFICANT CHANGE UP
WBC # BLD: 15.9 K/UL — HIGH (ref 3.8–10.5)
WBC # FLD AUTO: 15.9 K/UL — HIGH (ref 3.8–10.5)

## 2022-12-07 PROCEDURE — 99232 SBSQ HOSP IP/OBS MODERATE 35: CPT | Mod: GC

## 2022-12-07 RX ADMIN — Medication 500 MILLIGRAM(S): at 13:10

## 2022-12-07 RX ADMIN — HEPARIN SODIUM 5000 UNIT(S): 5000 INJECTION INTRAVENOUS; SUBCUTANEOUS at 21:30

## 2022-12-07 RX ADMIN — HEPARIN SODIUM 5000 UNIT(S): 5000 INJECTION INTRAVENOUS; SUBCUTANEOUS at 10:42

## 2022-12-07 RX ADMIN — Medication 500 MILLIGRAM(S): at 06:23

## 2022-12-07 RX ADMIN — Medication 1 TABLET(S): at 13:10

## 2022-12-07 RX ADMIN — Medication 500 MILLIGRAM(S): at 21:31

## 2022-12-07 RX ADMIN — Medication 325 MILLIGRAM(S): at 13:09

## 2022-12-07 NOTE — PROGRESS NOTE ADULT - ASSESSMENT
A/P: 28yo  at 24w5d with exam indicated cerclage admitted for management of PPROM@23w6d. No evidence of chorioamnionitis, PTL or abruption at this time. Patient stable and doing well.     #PPROM  - Ampicillin for 48 hours & Azithromycin x1 for latency, Amoxicillin for 5 days following Ampicillin  - NST BID  - s/p BMZ for FLM (-)  - s/p Mag for fetal neuroprotection  - Mohan cerclage to remain in situ    #Fetal wellbeing  - NST BID  - Appreciate NICU consult  - GBS (): pos    #Maternal wellbeing  - Regular diet  - HSQ/SCDs for DVT ppx  - PNV    Cecilia Hernandez, PGY-3 A/P: 28yo  at 24w5d with exam indicated cerclage admitted for management of PPROM@23w6d. No evidence of chorioamnionitis, PTL or abruption at this time. Patient stable and doing well.     #PPROM  - Ampicillin for 48 hours & Azithromycin x1 for latency, Amoxicillin for 5 days following Ampicillin  - NST BID  - s/p BMZ for FLM (-)  - s/p Mag for fetal neuroprotection  - Mohan cerclage to remain in situ    #Fetal wellbeing  - NST BID  - Appreciate NICU consult  - GBS (): pos    #Maternal wellbeing  - Regular diet  - HSQ/SCDs for DVT ppx  - PNV    Cecilia Hernandez, PGY-3      M Fellow Addendum    A/P: 28yo  at 24w5d with exam indicated cerclage admitted for management of PPROM@23w6d. VSS afebrile. Exam benign. leukocytosis stable. FHR w/ min variability today bpp 6/8 for fluid. Overall reassuring. Will cnt to monitor closely for s/s of chorio.    Patient seen with Dr. Del Valle (M attending)    Rubio Sharpe M.D. PGY-6  Maternal Fetal Medicine Fellow  Cell: [phone] if after 5pm or weekend ask labor and delivery for on call fellow

## 2022-12-07 NOTE — PROGRESS NOTE ADULT - SUBJECTIVE AND OBJECTIVE BOX
R3 Antepartum Note,       Patient seen and examined at bedside, no acute overnight events. No acute complaints. Pt reports +FM, denies LOF, VB, ctx, HA, epigastric pain, blurred vision, CP, SOB, N/V, fevers, and chills.    Vital Signs Last 24 Hours  T(C): 36.8 (12-07-22 @ 06:15), Max: 37.3 (12-06-22 @ 17:46)  HR: 89 (12-07-22 @ 06:15) (84 - 120)  BP: 107/64 (12-07-22 @ 06:15) (107/64 - 138/85)  RR: 17 (12-07-22 @ 06:15) (16 - 18)  SpO2: 100% (12-07-22 @ 06:15) (81% - 100%)    CAPILLARY BLOOD GLUCOSE          Physical Exam:  General: NAD  Abdomen: Soft, non-tender, gravid  Ext: No pain or swelling    Labs:             9.5    16.97 )-----------( 345      ( 12-06 @ 11:46 )             29.5     12-05 @ 19:00    137  |  99  |  8   ----------------------------<  90  4.2   |  25  |  0.67    Ca    10.0      12-05 @ 19:00    TPro  7.7  /  Alb  4.1  /  TBili  <0.2  /  DBili  x   /  AST  28  /  ALT  73  /  AlkPhos  69  12-05 @ 19:00            MEDICATIONS  (STANDING):  amoxicillin 500 milliGRAM(s) Oral every 8 hours  ascorbic acid 500 milliGRAM(s) Oral daily  ferrous    sulfate 325 milliGRAM(s) Oral daily  heparin   Injectable 5000 Unit(s) SubCutaneous every 12 hours  prenatal multivitamin 1 Tablet(s) Oral daily    MEDICATIONS  (PRN):

## 2022-12-07 NOTE — PROGRESS NOTE ADULT - ASSESSMENT
A/P: 26yo  at 24w6d with exam indicated cerclage admitted for management of PPROM@23w6d. No evidence of chorioamnionitis, PTL or abruption at this time. Patient stable and doing well.     #PPROM  - Ampicillin for 48 hours & Azithromycin x1 for latency, Amoxicillin for 5 days following Ampicillin  - NST BID  - s/p BMZ for FLM (-)  - s/p Mag for fetal neuroprotection  - Mohan cerclage to remain in situ    #Fetal wellbeing  - NST BID  - Appreciate NICU consult  - GBS (): pos    #Maternal wellbeing  - Regular diet  - HSQ/SCDs for DVT ppx  - PNV    Cecilia Hernandez, PGY-3

## 2022-12-07 NOTE — CHART NOTE - NSCHARTNOTEFT_GEN_A_CORE
Patient seen for bedside sonogram.     Vertex presentation/ posterior placenta/ Anhydraminos/ BPP 8/8   Fetus active through entire scan.     d/w Dr Sharpe MFM fellow  Stephanie PORTERP-BC

## 2022-12-07 NOTE — PROGRESS NOTE ADULT - TIME BILLING
I saw and evaluated Ms. Sheldon and agree with Resident and Fellow  assessment  and plan as above.   Concern for evolving intrauterine  infection.   Continue close maternal and fetal surveillance.   Discussed with oncoming on-call MFM Dr. Calderón and Primary OBs Miguel Hobbs and Rosa Maria.     Aylin Del Valle MD

## 2022-12-08 ENCOUNTER — ASOB RESULT (OUTPATIENT)
Age: 27
End: 2022-12-08

## 2022-12-08 ENCOUNTER — APPOINTMENT (OUTPATIENT)
Dept: ANTEPARTUM | Facility: CLINIC | Age: 27
End: 2022-12-08

## 2022-12-08 LAB
HCT VFR BLD CALC: 28.7 % — LOW (ref 34.5–45)
HGB BLD-MCNC: 9.3 G/DL — LOW (ref 11.5–15.5)
MCHC RBC-ENTMCNC: 27 PG — SIGNIFICANT CHANGE UP (ref 27–34)
MCHC RBC-ENTMCNC: 32.4 GM/DL — SIGNIFICANT CHANGE UP (ref 32–36)
MCV RBC AUTO: 83.2 FL — SIGNIFICANT CHANGE UP (ref 80–100)
NRBC # BLD: 0 /100 WBCS — SIGNIFICANT CHANGE UP (ref 0–0)
NRBC # FLD: 0 K/UL — SIGNIFICANT CHANGE UP (ref 0–0)
PLATELET # BLD AUTO: 352 K/UL — SIGNIFICANT CHANGE UP (ref 150–400)
RBC # BLD: 3.45 M/UL — LOW (ref 3.8–5.2)
RBC # FLD: 13.3 % — SIGNIFICANT CHANGE UP (ref 10.3–14.5)
WBC # BLD: 12.34 K/UL — HIGH (ref 3.8–10.5)
WBC # FLD AUTO: 12.34 K/UL — HIGH (ref 3.8–10.5)

## 2022-12-08 PROCEDURE — 76820 UMBILICAL ARTERY ECHO: CPT | Mod: 26

## 2022-12-08 PROCEDURE — 76819 FETAL BIOPHYS PROFIL W/O NST: CPT | Mod: 26

## 2022-12-08 RX ADMIN — Medication 500 MILLIGRAM(S): at 11:06

## 2022-12-08 RX ADMIN — HEPARIN SODIUM 5000 UNIT(S): 5000 INJECTION INTRAVENOUS; SUBCUTANEOUS at 11:06

## 2022-12-08 RX ADMIN — Medication 325 MILLIGRAM(S): at 11:06

## 2022-12-08 RX ADMIN — Medication 1 TABLET(S): at 11:06

## 2022-12-08 RX ADMIN — HEPARIN SODIUM 5000 UNIT(S): 5000 INJECTION INTRAVENOUS; SUBCUTANEOUS at 21:31

## 2022-12-08 NOTE — PROGRESS NOTE ADULT - SUBJECTIVE AND OBJECTIVE BOX
R3 Antepartum Note,       Patient seen and examined at bedside, no acute overnight events. No acute complaints. Pt reports +FM, denies LOF, VB, ctx, HA, epigastric pain, blurred vision, CP, SOB, N/V, fevers, and chills.    Vital Signs Last 24 Hours  T(C): 37 (12-08-22 @ 01:32), Max: 37.1 (12-07-22 @ 17:43)  HR: 83 (12-08-22 @ 01:32) (82 - 99)  BP: 109/59 (12-08-22 @ 01:32) (106/57 - 119/75)  RR: 17 (12-08-22 @ 01:32) (16 - 17)  SpO2: 100% (12-08-22 @ 01:32) (96% - 100%)    CAPILLARY BLOOD GLUCOSE          Physical Exam:  General: NAD  Abdomen: Soft, non-tender, gravid  Ext: No pain or swelling    Labs:             10.3   15.90 )-----------( 393      ( 12-07 @ 12:11 )             32.0                   MEDICATIONS  (STANDING):  ascorbic acid 500 milliGRAM(s) Oral daily  ferrous    sulfate 325 milliGRAM(s) Oral daily  heparin   Injectable 5000 Unit(s) SubCutaneous every 12 hours  prenatal multivitamin 1 Tablet(s) Oral daily    MEDICATIONS  (PRN):

## 2022-12-08 NOTE — PROGRESS NOTE ADULT - ATTENDING COMMENTS
Att:  Pt seen and examined by me today. I agree with findings, assessment and plan documented in resident note.

## 2022-12-09 RX ADMIN — HEPARIN SODIUM 5000 UNIT(S): 5000 INJECTION INTRAVENOUS; SUBCUTANEOUS at 12:05

## 2022-12-09 RX ADMIN — Medication 1 TABLET(S): at 12:05

## 2022-12-09 RX ADMIN — Medication 325 MILLIGRAM(S): at 12:05

## 2022-12-09 RX ADMIN — HEPARIN SODIUM 5000 UNIT(S): 5000 INJECTION INTRAVENOUS; SUBCUTANEOUS at 21:45

## 2022-12-09 RX ADMIN — Medication 500 MILLIGRAM(S): at 12:05

## 2022-12-09 NOTE — PROGRESS NOTE ADULT - SUBJECTIVE AND OBJECTIVE BOX
R3 Antepartum Note,       Patient seen and examined at bedside, no acute overnight events. No acute complaints. Pt reports +FM, denies LOF, VB, ctx, HA, epigastric pain, blurred vision, CP, SOB, N/V, fevers, and chills.    Vital Signs Last 24 Hours  T(C): 36.5 (12-09-22 @ 05:47), Max: 37.1 (12-08-22 @ 17:36)  HR: 75 (12-09-22 @ 05:47) (75 - 95)  BP: 114/75 (12-09-22 @ 05:47) (107/62 - 123/69)  RR: 18 (12-09-22 @ 05:47) (16 - 18)  SpO2: 100% (12-09-22 @ 05:47) (96% - 100%)    CAPILLARY BLOOD GLUCOSE          Physical Exam:  General: NAD  Abdomen: Soft, non-tender, gravid  Ext: No pain or swelling    Labs:             9.3    12.34 )-----------( 352      ( 12-08 @ 06:07 )             28.7                   MEDICATIONS  (STANDING):  ascorbic acid 500 milliGRAM(s) Oral daily  ferrous    sulfate 325 milliGRAM(s) Oral daily  heparin   Injectable 5000 Unit(s) SubCutaneous every 12 hours  prenatal multivitamin 1 Tablet(s) Oral daily    MEDICATIONS  (PRN):

## 2022-12-09 NOTE — PROGRESS NOTE ADULT - ASSESSMENT
A/P: 28yo  at 25w0d with exam indicated cerclage admitted for management of PPROM@23w6d. No evidence of chorioamnionitis, PTL or abruption at this time. Patient stable and doing well.     #PPROM  - Ampicillin for 48 hours & Azithromycin x1 for latency, Amoxicillin for 5 days following Ampicillin  - NST BID  - s/p BMZ for FLM (-)  - s/p Mag for fetal neuroprotection  - Mohan cerclage to remain in situ    #Fetal wellbeing  - NST BID  - Appreciate NICU consult  - GBS (): pos    #Maternal wellbeing  - Regular diet  - HSQ/SCDs for DVT ppx  - PNV    Cecilia Hernandez, PGY-3

## 2022-12-10 ENCOUNTER — TRANSCRIPTION ENCOUNTER (OUTPATIENT)
Age: 27
End: 2022-12-10

## 2022-12-10 LAB
ALBUMIN SERPL ELPH-MCNC: 3.7 G/DL — SIGNIFICANT CHANGE UP (ref 3.3–5)
ALP SERPL-CCNC: 64 U/L — SIGNIFICANT CHANGE UP (ref 40–120)
ALT FLD-CCNC: 23 U/L — SIGNIFICANT CHANGE UP (ref 4–33)
ANION GAP SERPL CALC-SCNC: 11 MMOL/L — SIGNIFICANT CHANGE UP (ref 7–14)
AST SERPL-CCNC: 11 U/L — SIGNIFICANT CHANGE UP (ref 4–32)
BILIRUB SERPL-MCNC: <0.2 MG/DL — SIGNIFICANT CHANGE UP (ref 0.2–1.2)
BLD GP AB SCN SERPL QL: NEGATIVE — SIGNIFICANT CHANGE UP
BUN SERPL-MCNC: 13 MG/DL — SIGNIFICANT CHANGE UP (ref 7–23)
CALCIUM SERPL-MCNC: 9.7 MG/DL — SIGNIFICANT CHANGE UP (ref 8.4–10.5)
CHLORIDE SERPL-SCNC: 99 MMOL/L — SIGNIFICANT CHANGE UP (ref 98–107)
CO2 SERPL-SCNC: 23 MMOL/L — SIGNIFICANT CHANGE UP (ref 22–31)
CREAT SERPL-MCNC: 0.59 MG/DL — SIGNIFICANT CHANGE UP (ref 0.5–1.3)
EGFR: 128 ML/MIN/1.73M2 — SIGNIFICANT CHANGE UP
GLUCOSE SERPL-MCNC: 88 MG/DL — SIGNIFICANT CHANGE UP (ref 70–99)
POTASSIUM SERPL-MCNC: 4.1 MMOL/L — SIGNIFICANT CHANGE UP (ref 3.5–5.3)
POTASSIUM SERPL-SCNC: 4.1 MMOL/L — SIGNIFICANT CHANGE UP (ref 3.5–5.3)
PROT SERPL-MCNC: 7 G/DL — SIGNIFICANT CHANGE UP (ref 6–8.3)
RH IG SCN BLD-IMP: POSITIVE — SIGNIFICANT CHANGE UP
SODIUM SERPL-SCNC: 133 MMOL/L — LOW (ref 135–145)

## 2022-12-10 RX ORDER — SODIUM CHLORIDE 9 MG/ML
1000 INJECTION, SOLUTION INTRAVENOUS ONCE
Refills: 0 | Status: COMPLETED | OUTPATIENT
Start: 2022-12-10 | End: 2022-12-10

## 2022-12-10 RX ADMIN — Medication 325 MILLIGRAM(S): at 13:35

## 2022-12-10 RX ADMIN — HEPARIN SODIUM 5000 UNIT(S): 5000 INJECTION INTRAVENOUS; SUBCUTANEOUS at 22:45

## 2022-12-10 RX ADMIN — Medication 1 TABLET(S): at 13:36

## 2022-12-10 RX ADMIN — HEPARIN SODIUM 5000 UNIT(S): 5000 INJECTION INTRAVENOUS; SUBCUTANEOUS at 13:35

## 2022-12-10 RX ADMIN — Medication 500 MILLIGRAM(S): at 13:35

## 2022-12-10 RX ADMIN — SODIUM CHLORIDE 1000 MILLILITER(S): 9 INJECTION, SOLUTION INTRAVENOUS at 20:55

## 2022-12-10 NOTE — CHART NOTE - NSCHARTNOTEFT_GEN_A_CORE
Pt evaluated for nonreactive FHT. BPP performed, 6/8 (fetus with hiccups--no breathing). FILIPE 3.99 (single pocket noted).    Eduardo, PGY3 Pt evaluated for nonreactive FHT. BPP performed, 8/8 (though fetus with hiccups). FILIPE 3.99 (single pocket noted). 2hr NST to be performed this evening. Pt to be started on NST x2hr BID monitoring.     Eduardo, PGY3  D/w Dr. Gama  D/w Dr. Calderón

## 2022-12-10 NOTE — PROGRESS NOTE ADULT - SUBJECTIVE AND OBJECTIVE BOX
R3 Antepartum Note    HD#10    Patient seen and examined at bedside, no acute overnight events. No acute complaints. Endorses small amount of leakage of fluid. Reports +FM, denies VB, ctx, HA, epigastric pain, blurred vision, CP, SOB, N/V, fevers, and chills.    Vital Signs Last 24 Hours  T(C): 36.7 (12-10-22 @ 05:41), Max: 37.1 (12-09-22 @ 09:33)  HR: 72 (12-10-22 @ 05:41) (72 - 85)  BP: 103/63 (12-10-22 @ 05:41) (103/63 - 114/57)  RR: 18 (12-10-22 @ 05:41) (16 - 18)  SpO2: 99% (12-10-22 @ 05:41) (98% - 100%)    Physical Exam:  General: NAD  Abdomen: Soft, non-tender, gravid  Ext: No pain or swelling    NST reactive overnight    Labs:  12-10 @ 05:40    133  |  99  |  13  ----------------------------<  88  4.1   |  23  |  0.59    Ca    9.7      12-10 @ 05:40    TPro  7.0  /  Alb  3.7  /  TBili  <0.2  /  DBili  x   /  AST  11  /  ALT  23  /  AlkPhos  64  12-10 @ 05:40            MEDICATIONS  (STANDING):  ascorbic acid 500 milliGRAM(s) Oral daily  ferrous    sulfate 325 milliGRAM(s) Oral daily  heparin   Injectable 5000 Unit(s) SubCutaneous every 12 hours  prenatal multivitamin 1 Tablet(s) Oral daily    MEDICATIONS  (PRN):

## 2022-12-10 NOTE — PROGRESS NOTE ADULT - ASSESSMENT
A/P: 28yo  at 25w1d with exam indicated cerclage admitted for management of PPROM@23w6d. No evidence of chorioamnionitis, PTL or abruption at this time. Patient stable and doing well.     #PPROM  - CBC q3d  - s/p Ampicillin, Azithromycin, Amoxicillin for latency  - NST BID  - s/p BMZ for FLM (-)  - s/p Mag for fetal neuroprotection  - Mohan cerclage to remain in situ    #Fetal wellbeing  - NST BID  - Appreciate NICU consult  - GBS (): pos    #Maternal wellbeing  - CMP revealed AST/ALT , f/u AM CMP to trend  - Regular diet  - HSQ/SCDs for DVT ppx  - PNV    Aaliyah Hwang PGY3

## 2022-12-11 ENCOUNTER — RESULT REVIEW (OUTPATIENT)
Age: 27
End: 2022-12-11

## 2022-12-11 LAB
BASOPHILS # BLD AUTO: 0.04 K/UL — SIGNIFICANT CHANGE UP (ref 0–0.2)
BASOPHILS # BLD AUTO: 0.04 K/UL — SIGNIFICANT CHANGE UP (ref 0–0.2)
BASOPHILS NFR BLD AUTO: 0.3 % — SIGNIFICANT CHANGE UP (ref 0–2)
BASOPHILS NFR BLD AUTO: 0.4 % — SIGNIFICANT CHANGE UP (ref 0–2)
EOSINOPHIL # BLD AUTO: 0.02 K/UL — SIGNIFICANT CHANGE UP (ref 0–0.5)
EOSINOPHIL # BLD AUTO: 0.06 K/UL — SIGNIFICANT CHANGE UP (ref 0–0.5)
EOSINOPHIL NFR BLD AUTO: 0.1 % — SIGNIFICANT CHANGE UP (ref 0–6)
EOSINOPHIL NFR BLD AUTO: 0.6 % — SIGNIFICANT CHANGE UP (ref 0–6)
HCT VFR BLD CALC: 27.2 % — LOW (ref 34.5–45)
HCT VFR BLD CALC: 31.7 % — LOW (ref 34.5–45)
HGB BLD-MCNC: 10.1 G/DL — LOW (ref 11.5–15.5)
HGB BLD-MCNC: 8.5 G/DL — LOW (ref 11.5–15.5)
IANC: 12.12 K/UL — HIGH (ref 1.8–7.4)
IANC: 6.02 K/UL — SIGNIFICANT CHANGE UP (ref 1.8–7.4)
IMM GRANULOCYTES NFR BLD AUTO: 0.8 % — SIGNIFICANT CHANGE UP (ref 0–0.9)
IMM GRANULOCYTES NFR BLD AUTO: 1.5 % — HIGH (ref 0–0.9)
LYMPHOCYTES # BLD AUTO: 1.71 K/UL — SIGNIFICANT CHANGE UP (ref 1–3.3)
LYMPHOCYTES # BLD AUTO: 11.4 % — LOW (ref 13–44)
LYMPHOCYTES # BLD AUTO: 2.38 K/UL — SIGNIFICANT CHANGE UP (ref 1–3.3)
LYMPHOCYTES # BLD AUTO: 25.7 % — SIGNIFICANT CHANGE UP (ref 13–44)
MCHC RBC-ENTMCNC: 26.2 PG — LOW (ref 27–34)
MCHC RBC-ENTMCNC: 26.6 PG — LOW (ref 27–34)
MCHC RBC-ENTMCNC: 31.3 GM/DL — LOW (ref 32–36)
MCHC RBC-ENTMCNC: 31.9 GM/DL — LOW (ref 32–36)
MCV RBC AUTO: 83.6 FL — SIGNIFICANT CHANGE UP (ref 80–100)
MCV RBC AUTO: 84 FL — SIGNIFICANT CHANGE UP (ref 80–100)
MONOCYTES # BLD AUTO: 0.61 K/UL — SIGNIFICANT CHANGE UP (ref 0–0.9)
MONOCYTES # BLD AUTO: 0.95 K/UL — HIGH (ref 0–0.9)
MONOCYTES NFR BLD AUTO: 6.4 % — SIGNIFICANT CHANGE UP (ref 2–14)
MONOCYTES NFR BLD AUTO: 6.6 % — SIGNIFICANT CHANGE UP (ref 2–14)
NEUTROPHILS # BLD AUTO: 12.12 K/UL — HIGH (ref 1.8–7.4)
NEUTROPHILS # BLD AUTO: 6.02 K/UL — SIGNIFICANT CHANGE UP (ref 1.8–7.4)
NEUTROPHILS NFR BLD AUTO: 65.2 % — SIGNIFICANT CHANGE UP (ref 43–77)
NEUTROPHILS NFR BLD AUTO: 81 % — HIGH (ref 43–77)
NRBC # BLD: 0 /100 WBCS — SIGNIFICANT CHANGE UP (ref 0–0)
NRBC # BLD: 0 /100 WBCS — SIGNIFICANT CHANGE UP (ref 0–0)
NRBC # FLD: 0 K/UL — SIGNIFICANT CHANGE UP (ref 0–0)
NRBC # FLD: 0.02 K/UL — HIGH (ref 0–0)
PLATELET # BLD AUTO: 303 K/UL — SIGNIFICANT CHANGE UP (ref 150–400)
PLATELET # BLD AUTO: 348 K/UL — SIGNIFICANT CHANGE UP (ref 150–400)
RBC # BLD: 3.24 M/UL — LOW (ref 3.8–5.2)
RBC # BLD: 3.79 M/UL — LOW (ref 3.8–5.2)
RBC # FLD: 13.5 % — SIGNIFICANT CHANGE UP (ref 10.3–14.5)
RBC # FLD: 13.7 % — SIGNIFICANT CHANGE UP (ref 10.3–14.5)
WBC # BLD: 14.96 K/UL — HIGH (ref 3.8–10.5)
WBC # BLD: 9.25 K/UL — SIGNIFICANT CHANGE UP (ref 3.8–10.5)
WBC # FLD AUTO: 14.96 K/UL — HIGH (ref 3.8–10.5)
WBC # FLD AUTO: 9.25 K/UL — SIGNIFICANT CHANGE UP (ref 3.8–10.5)

## 2022-12-11 PROCEDURE — 88307 TISSUE EXAM BY PATHOLOGIST: CPT | Mod: 26

## 2022-12-11 PROCEDURE — 99233 SBSQ HOSP IP/OBS HIGH 50: CPT | Mod: GC

## 2022-12-11 PROCEDURE — 59515 CESAREAN DELIVERY: CPT

## 2022-12-11 DEVICE — INTERCEED 3 X 4": Type: IMPLANTABLE DEVICE | Status: FUNCTIONAL

## 2022-12-11 DEVICE — SURGICEL FIBRILLAR 1 X 2": Type: IMPLANTABLE DEVICE | Status: FUNCTIONAL

## 2022-12-11 RX ORDER — MAGNESIUM SULFATE 500 MG/ML
4 VIAL (ML) INJECTION ONCE
Refills: 0 | Status: COMPLETED | OUTPATIENT
Start: 2022-12-11 | End: 2022-12-11

## 2022-12-11 RX ORDER — PROGESTERONE 200 MG/1
1 CAPSULE, LIQUID FILLED ORAL
Qty: 0 | Refills: 0 | DISCHARGE

## 2022-12-11 RX ORDER — HYDROMORPHONE HYDROCHLORIDE 2 MG/ML
30 INJECTION INTRAMUSCULAR; INTRAVENOUS; SUBCUTANEOUS
Refills: 0 | Status: DISCONTINUED | OUTPATIENT
Start: 2022-12-11 | End: 2022-12-12

## 2022-12-11 RX ORDER — NALOXONE HYDROCHLORIDE 4 MG/.1ML
0.1 SPRAY NASAL
Refills: 0 | Status: DISCONTINUED | OUTPATIENT
Start: 2022-12-11 | End: 2022-12-15

## 2022-12-11 RX ORDER — NALBUPHINE HYDROCHLORIDE 10 MG/ML
2.5 INJECTION, SOLUTION INTRAMUSCULAR; INTRAVENOUS; SUBCUTANEOUS EVERY 6 HOURS
Refills: 0 | Status: DISCONTINUED | OUTPATIENT
Start: 2022-12-11 | End: 2022-12-15

## 2022-12-11 RX ORDER — SODIUM CHLORIDE 9 MG/ML
1000 INJECTION, SOLUTION INTRAVENOUS
Refills: 0 | Status: DISCONTINUED | OUTPATIENT
Start: 2022-12-11 | End: 2022-12-11

## 2022-12-11 RX ORDER — AMPICILLIN TRIHYDRATE 250 MG
2 CAPSULE ORAL ONCE
Refills: 0 | Status: COMPLETED | OUTPATIENT
Start: 2022-12-11 | End: 2022-12-11

## 2022-12-11 RX ORDER — ONDANSETRON 8 MG/1
4 TABLET, FILM COATED ORAL ONCE
Refills: 0 | Status: DISCONTINUED | OUTPATIENT
Start: 2022-12-11 | End: 2022-12-11

## 2022-12-11 RX ORDER — IBUPROFEN 200 MG
1 TABLET ORAL
Qty: 0 | Refills: 0 | DISCHARGE

## 2022-12-11 RX ORDER — HYDROMORPHONE HYDROCHLORIDE 2 MG/ML
0.5 INJECTION INTRAMUSCULAR; INTRAVENOUS; SUBCUTANEOUS
Refills: 0 | Status: DISCONTINUED | OUTPATIENT
Start: 2022-12-11 | End: 2022-12-11

## 2022-12-11 RX ORDER — MAGNESIUM SULFATE 500 MG/ML
2 VIAL (ML) INJECTION
Qty: 40 | Refills: 0 | Status: DISCONTINUED | OUTPATIENT
Start: 2022-12-11 | End: 2022-12-12

## 2022-12-11 RX ORDER — ONDANSETRON 8 MG/1
4 TABLET, FILM COATED ORAL EVERY 6 HOURS
Refills: 0 | Status: DISCONTINUED | OUTPATIENT
Start: 2022-12-11 | End: 2022-12-15

## 2022-12-11 RX ORDER — AMPICILLIN TRIHYDRATE 250 MG
1 CAPSULE ORAL EVERY 4 HOURS
Refills: 0 | Status: DISCONTINUED | OUTPATIENT
Start: 2022-12-11 | End: 2022-12-13

## 2022-12-11 RX ORDER — OXYTOCIN 10 UNIT/ML
41.67 VIAL (ML) INJECTION
Qty: 20 | Refills: 0 | Status: DISCONTINUED | OUTPATIENT
Start: 2022-12-11 | End: 2022-12-15

## 2022-12-11 RX ADMIN — Medication 1 TABLET(S): at 09:21

## 2022-12-11 RX ADMIN — HYDROMORPHONE HYDROCHLORIDE 30 MILLILITER(S): 2 INJECTION INTRAMUSCULAR; INTRAVENOUS; SUBCUTANEOUS at 22:58

## 2022-12-11 RX ADMIN — HEPARIN SODIUM 5000 UNIT(S): 5000 INJECTION INTRAVENOUS; SUBCUTANEOUS at 09:21

## 2022-12-11 RX ADMIN — Medication 125 MILLIUNIT(S)/MIN: at 21:31

## 2022-12-11 RX ADMIN — Medication 325 MILLIGRAM(S): at 09:22

## 2022-12-11 RX ADMIN — HYDROMORPHONE HYDROCHLORIDE 30 MILLILITER(S): 2 INJECTION INTRAMUSCULAR; INTRAVENOUS; SUBCUTANEOUS at 22:56

## 2022-12-11 RX ADMIN — Medication 200 GRAM(S): at 16:09

## 2022-12-11 RX ADMIN — Medication 50 GM/HR: at 19:43

## 2022-12-11 RX ADMIN — Medication 300 GRAM(S): at 16:06

## 2022-12-11 RX ADMIN — Medication 500 MILLIGRAM(S): at 09:22

## 2022-12-11 RX ADMIN — SODIUM CHLORIDE 75 MILLILITER(S): 9 INJECTION, SOLUTION INTRAVENOUS at 20:17

## 2022-12-11 NOTE — OB PROVIDER DELIVERY SUMMARY - AS DELIV COMPLICATIONS OB
abnormal fetal heart rate tracing/prolonged rupture of membranes abnormal fetal heart rate tracing/prolonged rupture of membranes/premature rupture of membranes prior to labor

## 2022-12-11 NOTE — PROGRESS NOTE ADULT - ATTENDING COMMENTS
MFM attg   agree with plan- seen and evaluated by me at bedside  No signs of intraamniotic infection or labor,   Plan for close fetal monitoring with repeat BPP

## 2022-12-11 NOTE — OB RN INTRAOPERATIVE NOTE - NSSELHIDDEN_OBGYN_ALL_OB_FT
[NS_DeliveryAttending1_OBGYN_ALL_OB_FT:[id number]],[NS_DeliveryAssist1_OBGYN_ALL_OB_FT:[id number]],[NS_DeliveryRN_OBGYN_ALL_OB_FT:[id number]=]

## 2022-12-11 NOTE — CHART NOTE - NSCHARTNOTEFT_GEN_A_CORE
PACU/OR 3 retrospective chart note   Attg.  called by PGY 4 Kelli Rockwell re: pt now experiencing painful ctx's on 4Tower, and that she is being transferred  down to PACU for management. Upon arrival , I encountered pt in PACU 5 bay, pt was experiencing painful ctx's  approx  . every 5-7 min . FHR was noted to be in the 150's audibly ( pt was difficult to trace as fetus was low in the pelvis and pt was writhing in pain. VE 3/80/-1 with tension on the cerclage with each ctx. Plan was for removal of cerclage in anticipation of . pt was loaded with Mg2+ for neuroprotection and given antibiotic prophylaxis Anesthesia was notified and an epidural was requested . NICU was also notified via telephone. a second I.V. line was started by Surekha Clifford R.N. and vertex presentation was confirmed via sono by Debo Herring M.D. Epidural was placed in PACU 5 bay , after which patient was transferred to O.R. 3 where pt was placed in the lithotomy position and the single suture of prolene was removed  from the cervix. VE post removal was 3/90/0. pt continued to have regular uterine ctx's q 3-4 min. pt was monitored for a short time in OR 3 for progress in labor post cerclage removal , and repeat exams revealed no demonstrable change, At this time the FHR tracing began to exhibit deep variable decelerations  with progressive loss of variability, at which point decision was made to proceed with  section . pt was placed back in supine position, and the team was called to prepare for  section. Anesthesia was called in as pt was not experiencing any relief from pain s/p epidural placement, Anesthesia determined that the epidural /epidural catheter was not  functioning,. We then requested that the patient be put to sleep. After a short period of difficulty in intubation , intubation was successful and we proceeded with now an emergency  section. a classical incision was made on the uterus, and  live female  was delivered , apgars 6/7/8 , weighing 790g. the  was resuscitated in the NSU before being taken to the NICU. The  proceeded without complication , QBL- 401cc. pt was transferred to the  in stable condition.     Danni BOBBY

## 2022-12-11 NOTE — OB RN DELIVERY SUMMARY - NSSELHIDDEN_OBGYN_ALL_OB_FT
[NS_DeliveryAttending1_OBGYN_ALL_OB_FT:[id number]],[NS_DeliveryAssist1_OBGYN_ALL_OB_FT:[id number]],[NS_DeliveryRN_OBGYN_ALL_OB_FT:[id number]=]

## 2022-12-11 NOTE — OB PROVIDER DELIVERY SUMMARY - NSPROVIDERDELIVERYNOTE_OBGYN_ALL_OB_FT
primary classical  section at 25.2 weeks due to PPROM with PTL and Category 3 tracing (absent variability, recurrent variable decelerations)  cerclage removed prior to delivery  delivery of viable female infant from vertex presentation through classical  incision  underdeveloped lower uterine segment  grossly normal uterus, b/l tubes and ovaries  hysterotomy closed in 3 layers  fibrillar and intercede placed over hysterotomy  401/1800/500  Dictation#[id number]

## 2022-12-11 NOTE — OB PROVIDER DELIVERY SUMMARY - NSSELHIDDEN_OBGYN_ALL_OB_FT
[NS_DeliveryAttending1_OBGYN_ALL_OB_FT:[id number]],[NS_DeliveryAssist1_OBGYN_ALL_OB_FT:[id number]]

## 2022-12-11 NOTE — PROGRESS NOTE ADULT - ASSESSMENT
28yo  at 25w2d with exam indicated cerclage admitted for management of PPROM@23w6d. No evidence of chorioamnionitis, PTL or abruption at this time. Patient stable and doing well.     #PPROM  - CBC q3d  - s/p Ampicillin, Azithromycin, Amoxicillin for latency  - NST BID  - s/p BMZ for FLM (-)  - s/p Mg for fetal neuroprotection  - Mohan cerclage to remain in situ    #Fetal wellbeing  - NST BID  - s/p BMZ for FLM (-)  - s/p Mg for fetal neuroprotection  - Appreciate NICU consult  - GBS (): pos    #Maternal wellbeing  - CMP w/ transaminitis resolved       -- AST/ALT  ->   - Regular diet  - HSQ/SCDs for DVT ppx  - PNV    Mvula PGY3

## 2022-12-11 NOTE — OB RN DELIVERY SUMMARY - NS_SEPSISRSKCALC_OBGYN_ALL_OB_FT
Unable to calculate the EOS score due to gestational age being less than 34 weeks or more than 43 weeks.  Rupture of membranes must be entered above.

## 2022-12-11 NOTE — CHART NOTE - NSCHARTNOTEFT_GEN_A_CORE
incomplete note Called to bedside to evaluate patient due to painful contractions.    VS  T(C): 36.6 (12-11-22 @ 14:11)  HR: 96 (12-11-22 @ 17:26)  BP: 149/67 (12-11-22 @ 17:24)  RR: 16 (12-11-22 @ 14:11)  SpO2: 98% (12-11-22 @ 17:22)    Gen: NAD, pain with contractions  Resp: unlabored on RA  CV: RR  GI: soft, nontender, gravid, contractions palpated  SSE: cerclage visualized, cervix appears dilated; no blood or pooling of fluid  SVE: 3/70/-2  Bedside Sono: VERTEX    Plan  - transfer to L&D for PTL, r/o Chorio  - STAT CBC  - Start Mg for neuroprotection, Amp for GBS positive  - will plan for cerclage removal  - continuous monitoring    SILAS Howard, PGY-4  d/w Dr. Gama

## 2022-12-11 NOTE — PROGRESS NOTE ADULT - SUBJECTIVE AND OBJECTIVE BOX
R3 OB Antepartum Progress Note    Patient seen and examined at bedside, no acute overnight events. No acute complaints. Patient denies contractions, vaginal bleeding, leakage of fluid. Reports good fetal movement. Patient is ambulating and tolerating regular diet. Denies CP, SOB, N/V, fevers, chills, or any other concerns.    Vital Signs Last 24 Hours  T(C): 36.6 (12-11-22 @ 01:40), Max: 36.9 (12-10-22 @ 14:26)  HR: 81 (12-11-22 @ 01:40) (76 - 93)  BP: 106/60 (12-11-22 @ 01:40) (106/60 - 116/68)  RR: 17 (12-11-22 @ 01:40) (16 - 17)  SpO2: 98% (12-11-22 @ 01:40) (98% - 100%)    I&O's Summary  09 Dec 2022 07:01  -  10 Dec 2022 07:00  --------------------------------------------------------  IN: 0 mL / OUT: 700 mL / NET: -700 mL    10 Dec 2022 07:01  -  11 Dec 2022 06:12  --------------------------------------------------------  IN: 1000 mL / OUT: 2450 mL / NET: -1450 mL      Physical Exam:  General: NAD  CV: RR  Lungs: breathing comfortably on RA  Abdomen: soft, gravid, non-distended, non-tender  Ext: no pain or swelling    Labs:             9.3<L>  12.34<H> )-----------( 352      ( 12-08 @ 06:07 )             28.7<L>               10.3<L>  15.90<H> )-----------( 393      ( 12-07 @ 12:11 )             32.0<L>               9.5<L>  16.97<H> )-----------( 345      ( 12-06 @ 11:46 )             29.5<L>        MEDICATIONS  (STANDING):  ascorbic acid 500 milliGRAM(s) Oral daily  ferrous    sulfate 325 milliGRAM(s) Oral daily  heparin   Injectable 5000 Unit(s) SubCutaneous every 12 hours  prenatal multivitamin 1 Tablet(s) Oral daily

## 2022-12-11 NOTE — PROGRESS NOTE ADULT - SUBJECTIVE AND OBJECTIVE BOX
MFM Consult Daily Progress Note    [id number]  NIMA BRYAN    Subjective: Patient is without complaints. She reports leakage of clear fluid. She denies fevers/chills, cramping/contractions, vaginal bleeding, leakage of purulent or malodorous discharge. She reports normal fetal movement.    Objective:  T(C): 36.6 (12-11-22 @ 14:11), Max: 36.9 (12-10-22 @ 22:04)  HR: 88 (12-11-22 @ 14:11) (76 - 93)  BP: 112/57 (12-11-22 @ 14:11) (105/60 - 116/68)  RR: 16 (12-11-22 @ 14:11) (16 - 18)  SpO2: 99% (12-11-22 @ 14:11) (98% - 100%)    12-10-22 @ 07:01  -  12-11-22 @ 07:00  --------------------------------------------------------  IN: 1000 mL / OUT: 2450 mL / NET: -1450 mL    12-11-22 @ 07:01  -  12-11-22 @ 16:05  --------------------------------------------------------  IN: 0 mL / OUT: 2000 mL / NET: -2000 mL    Physical Exam:  General: Well appearing, resting comfortably in bed  Cardiac: Regular rate  Pulm: Breathing comfortably on room air  Abdomen: Soft, non-tender, no fundal tenderness, no rebound or guarding    Fetal monitoring  Fetal: 150, minimal variability, rare variable acceleration, occasional acceleration  Tocometry: acontractile    Labs:                        8.5    9.25  )-----------( 303      ( 11 Dec 2022 06:30 )             27.2     Bedside ultrasound: Vertex, FILIPE 3 (MVP >2), BPP 6/8 (-2 breathing)

## 2022-12-11 NOTE — OB NEONATOLOGY/PEDIATRICIAN DELIVERY SUMMARY - NSPEDSNEONOTESA_OBGYN_ALL_OB_FT
25wk2d F born to a 26 y/o  mother with Hx short cervix. No other pertinent maternal history. HIV negative, HBsAg negative, rubella immune, and RPR non-reactive. GBS+ (), s/p amp x 5. PPROM with clear fluid at 2300 on . APGARS 6/7/8. Pt was brought to the warmer and started on PPV (initial PEEP at 7, then brought down to 6) for apnea at birth. Spontaneous breathing noted at 9 MOL, with improvement in color. FiO2 100% and weaned down. CPAP was re-initiated for 2 minutes after PPV. Temperature at 15 MOL was 36.9. Pt admitted to the NICU and with 40% FiO2 oxygen support en route. NICU called to attend primary  at 25 2/7 weeks for extreme prematurity and Cat 2 tracing. Mom is a 26 yo  mother. Past OB hx significant for ovarian cysts and SAB x 1. Pregnancy complicated by short cervix (cerclage placed 22), admitted for observation since  (s/p beta  & , s/p Mg for neuroprotection), PPROM with clear fluid at 2300 on , cerlage removed  and after removal developed Cat 2 tracing. Emerged Vertex, with minimal respiratory effort, mild hypotonia for gestational age but with spontaneous movement, HR  bpm. APGARS 6/7/8. PPV was started for apnea within 30 seconds of life (initial PIP/PEEP at 20/5, increased to 25/7, then brought down to 22/6). FiO2 initially 30%, increased gradually to maximum 100% then weaned as O2Sat reached appropriate range for age by 6 minutes of life (MOL). HR remained mostly 120-160 bpm afterwards. Spontaneous breathing noted at 9 MOL, with improvement in color, so PPV was stopped, CPAP was initiated, FiO2 weaned to 40% prior to leaving L&D. Temperature at 15 MOL was 36.9C. Patient is admitted to the NICU for prematurity and its related issues including RDS, apnea, as well as suspected sepsis in the setting of PPROM.

## 2022-12-11 NOTE — CHART NOTE - NSCHARTNOTESELECT_GEN_ALL_CORE
Event Note
OB
PACU/OR 3 retrospective chart note
BPP
BPP/Event Note
NICU Consult/Event Note
Progress note

## 2022-12-11 NOTE — PROGRESS NOTE ADULT - ASSESSMENT
Assessment: 26 YO  at 25w2d with cerclage in situ admitted with PPROM. Patient is afebrile, non tachycardic and normotensive. WBC normal (9.3). Fetal monitoring notable for minimal variability. BPP 6/10 (-2 NST, -2 breathing). No overt evidence of intraamniotic infection, placental abruption, or  labor. Will continue to monitor fetal status closely, but will defer delivery at this time given extreme prematurity.    Recommendations:  -Twice daily fetal monitoring, patient to remain on monitor for 1 hour after BPP (or longer if concern for worsening fetal status)  -Repeat BPP tonight  -S/p BMZ -, rescue BMZ eligible  -S/p latency antibiotics  -GBS positive, needs ampicillin in event of delivery  -Would need magnesium for fetal neuroprotection in the event of delivery  -Would remove cerclage if evidence of  labor or intraamniotic infection  -No indication for delivery at this time (intraamniotic infection, placental abruption,  labor, nonreassuring fetal status necessitating delivery)    Meaghan Vernon MD  MFM Fellow Assessment: 26 YO  at 25w2d with cerclage in situ admitted with PPROM. Patient is afebrile, non tachycardic and normotensive. WBC normal (9.3). Fetal monitoring notable for minimal variability. BPP 6/10 (-2 NST, -2 breathing). No overt evidence of intraamniotic infection, placental abruption, or  labor, but change in fetal status does indicate clinical change. Will continue to monitor fetal status closely, but will defer delivery at this time given extreme prematurity.    Recommendations:  -Twice daily fetal monitoring, patient to remain on monitor for 1 hour after BPP (or longer if concern for worsening fetal status)  -Repeat BPP tonight  -S/p BMZ -, rescue BMZ eligible  -S/p latency antibiotics  -GBS positive, needs ampicillin in event of delivery  -Would need magnesium for fetal neuroprotection in the event of delivery  -Would remove cerclage if evidence of  labor or intraamniotic infection  -No indication for delivery at this time (intraamniotic infection, placental abruption,  labor, nonreassuring fetal status necessitating delivery)    Meaghan Vernon MD  MFM Fellow

## 2022-12-12 DIAGNOSIS — O42.10 PREMATURE RUPTURE OF MEMBRANES, ONSET OF LABOR MORE THAN 24 HOURS FOLLOWING RUPTURE, UNSPECIFIED WEEKS OF GESTATION: ICD-10-CM

## 2022-12-12 LAB — SARS-COV-2 RNA SPEC QL NAA+PROBE: SIGNIFICANT CHANGE UP

## 2022-12-12 RX ORDER — HEPARIN SODIUM 5000 [USP'U]/ML
5000 INJECTION INTRAVENOUS; SUBCUTANEOUS EVERY 12 HOURS
Refills: 0 | Status: DISCONTINUED | OUTPATIENT
Start: 2022-12-12 | End: 2022-12-15

## 2022-12-12 RX ORDER — SODIUM CHLORIDE 9 MG/ML
1000 INJECTION, SOLUTION INTRAVENOUS
Refills: 0 | Status: DISCONTINUED | OUTPATIENT
Start: 2022-12-12 | End: 2022-12-13

## 2022-12-12 RX ORDER — SIMETHICONE 80 MG/1
80 TABLET, CHEWABLE ORAL EVERY 4 HOURS
Refills: 0 | Status: DISCONTINUED | OUTPATIENT
Start: 2022-12-12 | End: 2022-12-15

## 2022-12-12 RX ORDER — OXYTOCIN 10 UNIT/ML
333.33 VIAL (ML) INJECTION
Qty: 20 | Refills: 0 | Status: DISCONTINUED | OUTPATIENT
Start: 2022-12-12 | End: 2022-12-15

## 2022-12-12 RX ORDER — OXYCODONE HYDROCHLORIDE 5 MG/1
5 TABLET ORAL
Refills: 0 | Status: COMPLETED | OUTPATIENT
Start: 2022-12-12 | End: 2022-12-19

## 2022-12-12 RX ORDER — DIPHENHYDRAMINE HCL 50 MG
25 CAPSULE ORAL EVERY 6 HOURS
Refills: 0 | Status: DISCONTINUED | OUTPATIENT
Start: 2022-12-12 | End: 2022-12-15

## 2022-12-12 RX ORDER — TETANUS TOXOID, REDUCED DIPHTHERIA TOXOID AND ACELLULAR PERTUSSIS VACCINE, ADSORBED 5; 2.5; 8; 8; 2.5 [IU]/.5ML; [IU]/.5ML; UG/.5ML; UG/.5ML; UG/.5ML
0.5 SUSPENSION INTRAMUSCULAR ONCE
Refills: 0 | Status: DISCONTINUED | OUTPATIENT
Start: 2022-12-12 | End: 2022-12-15

## 2022-12-12 RX ORDER — MAGNESIUM HYDROXIDE 400 MG/1
30 TABLET, CHEWABLE ORAL
Refills: 0 | Status: DISCONTINUED | OUTPATIENT
Start: 2022-12-12 | End: 2022-12-15

## 2022-12-12 RX ORDER — ACETAMINOPHEN 500 MG
975 TABLET ORAL
Refills: 0 | Status: DISCONTINUED | OUTPATIENT
Start: 2022-12-12 | End: 2022-12-15

## 2022-12-12 RX ORDER — IBUPROFEN 200 MG
600 TABLET ORAL EVERY 6 HOURS
Refills: 0 | Status: COMPLETED | OUTPATIENT
Start: 2022-12-12 | End: 2023-11-10

## 2022-12-12 RX ORDER — IBUPROFEN 200 MG
600 TABLET ORAL EVERY 6 HOURS
Refills: 0 | Status: DISCONTINUED | OUTPATIENT
Start: 2022-12-12 | End: 2022-12-15

## 2022-12-12 RX ORDER — LANOLIN
1 OINTMENT (GRAM) TOPICAL EVERY 6 HOURS
Refills: 0 | Status: DISCONTINUED | OUTPATIENT
Start: 2022-12-12 | End: 2022-12-15

## 2022-12-12 RX ORDER — KETOROLAC TROMETHAMINE 30 MG/ML
30 SYRINGE (ML) INJECTION EVERY 6 HOURS
Refills: 0 | Status: COMPLETED | OUTPATIENT
Start: 2022-12-12 | End: 2022-12-13

## 2022-12-12 RX ORDER — OXYCODONE HYDROCHLORIDE 5 MG/1
5 TABLET ORAL
Refills: 0 | Status: DISCONTINUED | OUTPATIENT
Start: 2022-12-12 | End: 2022-12-15

## 2022-12-12 RX ADMIN — OXYCODONE HYDROCHLORIDE 5 MILLIGRAM(S): 5 TABLET ORAL at 23:06

## 2022-12-12 RX ADMIN — OXYCODONE HYDROCHLORIDE 5 MILLIGRAM(S): 5 TABLET ORAL at 22:59

## 2022-12-12 RX ADMIN — Medication 30 MILLIGRAM(S): at 12:30

## 2022-12-12 RX ADMIN — Medication 600 MILLIGRAM(S): at 21:31

## 2022-12-12 RX ADMIN — Medication 600 MILLIGRAM(S): at 22:30

## 2022-12-12 RX ADMIN — HYDROMORPHONE HYDROCHLORIDE 30 MILLILITER(S): 2 INJECTION INTRAMUSCULAR; INTRAVENOUS; SUBCUTANEOUS at 07:32

## 2022-12-12 RX ADMIN — HEPARIN SODIUM 5000 UNIT(S): 5000 INJECTION INTRAVENOUS; SUBCUTANEOUS at 21:31

## 2022-12-12 RX ADMIN — Medication 975 MILLIGRAM(S): at 09:30

## 2022-12-12 RX ADMIN — Medication 975 MILLIGRAM(S): at 22:30

## 2022-12-12 RX ADMIN — Medication 975 MILLIGRAM(S): at 21:31

## 2022-12-12 RX ADMIN — Medication 30 MILLIGRAM(S): at 12:31

## 2022-12-12 NOTE — PROGRESS NOTE ADULT - SUBJECTIVE AND OBJECTIVE BOX
OB Progress Note:  Delivery, POD#1    S: 28yo POD#1 s/p pCCS for cat 2 tracing in the setting of PPROM/PTL. Her pain is well controlled. She is tolerating a regular diet and passing flatus. Denies N/V. Denies CP/SOB/lightheadedness/dizziness. She is ambulating without difficulty. Voiding spontaneously.     O:   Vital Signs Last 24 Hrs  T(C): 36.7 (12 Dec 2022 09:49), Max: 37.1 (12 Dec 2022 06:15)  T(F): 98 (12 Dec 2022 09:49), Max: 98.7 (12 Dec 2022 06:15)  HR: 81 (12 Dec 2022 09:49) (78 - 115)  BP: 108/63 (12 Dec 2022 09:49) (93/67 - 149/67)  BP(mean): 68 (11 Dec 2022 21:15) (66 - 92)  RR: 18 (12 Dec 2022 09:49) (15 - 19)  SpO2: 99% (12 Dec 2022 09:49) (91% - 100%)    Parameters below as of 12 Dec 2022 09:49  Patient On (Oxygen Delivery Method): room air        Labs:  Blood type: O Positive  Rubella IgG: RPR: Negative                          10.1<L>   14.96<H> >-----------< 348    (  @ 16:26 )             31.7<L>                        8.5<L>   9.25 >-----------< 303    (  @ 06:30 )             27.2<L>    12-10-22 @ 05:40      133<L>  |  99  |  13  ----------------------------<  88  4.1   |  23  |  0.59        Ca    9.7      10 Dec 2022 05:40    TPro  7.0  /  Alb  3.7  /  TBili  <0.2  /  DBili  x   /  AST  11  /  ALT  23  /  AlkPhos  64  12-10-22 @ 05:40          PE:  General: NAD  Abdomen: Mildly distended, appropriately tender, incision c/d/i.  Extremities: No erythema, no pitting edema

## 2022-12-12 NOTE — PROGRESS NOTE ADULT - ASSESSMENT
26yo POD#1 s/p pCCS for cat 2 tracing in the setting of PPROM/PTL.  Patient is stable and doing well post-operatively.    - Continue regular diet.  - Increase ambulation.  - PCA discontinued this AM, transition to PO pain medication  - Continue PP care      omero JONES

## 2022-12-13 DIAGNOSIS — O41.02X0 OLIGOHYDRAMNIOS, SECOND TRIMESTER, NOT APPLICABLE OR UNSPECIFIED: ICD-10-CM

## 2022-12-13 DIAGNOSIS — O26.872 CERVICAL SHORTENING, SECOND TRIMESTER: ICD-10-CM

## 2022-12-13 DIAGNOSIS — O99.012 ANEMIA COMPLICATING PREGNANCY, SECOND TRIMESTER: ICD-10-CM

## 2022-12-13 DIAGNOSIS — O42.912 PRETERM PREMATURE RUPTURE OF MEMBRANES, UNSPECIFIED AS TO LENGTH OF TIME BETWEEN RUPTURE AND ONSET OF LABOR, SECOND TRIMESTER: ICD-10-CM

## 2022-12-13 DIAGNOSIS — O34.32 MATERNAL CARE FOR CERVICAL INCOMPETENCE, SECOND TRIMESTER: ICD-10-CM

## 2022-12-13 LAB
BASOPHILS # BLD AUTO: 0.03 K/UL — SIGNIFICANT CHANGE UP (ref 0–0.2)
BASOPHILS NFR BLD AUTO: 0.2 % — SIGNIFICANT CHANGE UP (ref 0–2)
EOSINOPHIL # BLD AUTO: 0.02 K/UL — SIGNIFICANT CHANGE UP (ref 0–0.5)
EOSINOPHIL NFR BLD AUTO: 0.1 % — SIGNIFICANT CHANGE UP (ref 0–6)
HCT VFR BLD CALC: 25.4 % — LOW (ref 34.5–45)
HGB BLD-MCNC: 7.8 G/DL — LOW (ref 11.5–15.5)
IANC: 10.06 K/UL — HIGH (ref 1.8–7.4)
IMM GRANULOCYTES NFR BLD AUTO: 0.6 % — SIGNIFICANT CHANGE UP (ref 0–0.9)
LYMPHOCYTES # BLD AUTO: 19.6 % — SIGNIFICANT CHANGE UP (ref 13–44)
LYMPHOCYTES # BLD AUTO: 2.65 K/UL — SIGNIFICANT CHANGE UP (ref 1–3.3)
MCHC RBC-ENTMCNC: 26.4 PG — LOW (ref 27–34)
MCHC RBC-ENTMCNC: 30.7 GM/DL — LOW (ref 32–36)
MCV RBC AUTO: 85.8 FL — SIGNIFICANT CHANGE UP (ref 80–100)
MONOCYTES # BLD AUTO: 0.7 K/UL — SIGNIFICANT CHANGE UP (ref 0–0.9)
MONOCYTES NFR BLD AUTO: 5.2 % — SIGNIFICANT CHANGE UP (ref 2–14)
NEUTROPHILS # BLD AUTO: 10.06 K/UL — HIGH (ref 1.8–7.4)
NEUTROPHILS NFR BLD AUTO: 74.3 % — SIGNIFICANT CHANGE UP (ref 43–77)
NRBC # BLD: 0 /100 WBCS — SIGNIFICANT CHANGE UP (ref 0–0)
NRBC # FLD: 0 K/UL — SIGNIFICANT CHANGE UP (ref 0–0)
PLATELET # BLD AUTO: 293 K/UL — SIGNIFICANT CHANGE UP (ref 150–400)
RBC # BLD: 2.96 M/UL — LOW (ref 3.8–5.2)
RBC # FLD: 14.1 % — SIGNIFICANT CHANGE UP (ref 10.3–14.5)
WBC # BLD: 13.54 K/UL — HIGH (ref 3.8–10.5)
WBC # FLD AUTO: 13.54 K/UL — HIGH (ref 3.8–10.5)

## 2022-12-13 RX ADMIN — OXYCODONE HYDROCHLORIDE 5 MILLIGRAM(S): 5 TABLET ORAL at 02:42

## 2022-12-13 RX ADMIN — Medication 600 MILLIGRAM(S): at 11:32

## 2022-12-13 RX ADMIN — Medication 975 MILLIGRAM(S): at 21:06

## 2022-12-13 RX ADMIN — HEPARIN SODIUM 5000 UNIT(S): 5000 INJECTION INTRAVENOUS; SUBCUTANEOUS at 10:45

## 2022-12-13 RX ADMIN — OXYCODONE HYDROCHLORIDE 5 MILLIGRAM(S): 5 TABLET ORAL at 02:12

## 2022-12-13 RX ADMIN — Medication 975 MILLIGRAM(S): at 12:38

## 2022-12-13 RX ADMIN — Medication 975 MILLIGRAM(S): at 20:37

## 2022-12-13 RX ADMIN — Medication 975 MILLIGRAM(S): at 02:11

## 2022-12-13 RX ADMIN — OXYCODONE HYDROCHLORIDE 5 MILLIGRAM(S): 5 TABLET ORAL at 00:06

## 2022-12-13 RX ADMIN — HEPARIN SODIUM 5000 UNIT(S): 5000 INJECTION INTRAVENOUS; SUBCUTANEOUS at 21:30

## 2022-12-13 RX ADMIN — Medication 600 MILLIGRAM(S): at 20:36

## 2022-12-13 RX ADMIN — Medication 975 MILLIGRAM(S): at 11:32

## 2022-12-13 RX ADMIN — OXYCODONE HYDROCHLORIDE 5 MILLIGRAM(S): 5 TABLET ORAL at 00:10

## 2022-12-13 RX ADMIN — Medication 600 MILLIGRAM(S): at 12:38

## 2022-12-13 RX ADMIN — Medication 600 MILLIGRAM(S): at 21:06

## 2022-12-13 RX ADMIN — Medication 975 MILLIGRAM(S): at 03:11

## 2022-12-13 NOTE — PROGRESS NOTE ADULT - SUBJECTIVE AND OBJECTIVE BOX
OB Progress Note: pCCS POD#2    S: 26yo POD#2 s/p PCCS. Pain is well controlled. She is tolerating a regular diet and passing flatus. She is voiding spontaneously, and ambulating without difficulty. Denies CP/SOB. Denies lightheadedness/dizziness. Denies N/V. Denies sxs od PEC: denies headache, visual changes, RUQ pain, respiratory distress    O:  Vitals:  Vital Signs Last 24 Hrs  T(C): 36.7 (13 Dec 2022 06:06), Max: 37.1 (12 Dec 2022 06:15)  T(F): 98.1 (13 Dec 2022 06:06), Max: 98.7 (12 Dec 2022 06:15)  HR: 95 (13 Dec 2022 06:06) (76 - 95)  BP: 113/76 (13 Dec 2022 06:06) (91/59 - 113/76)  BP(mean): --  RR: 18 (13 Dec 2022 06:06) (16 - 18)  SpO2: 100% (13 Dec 2022 06:06) (98% - 100%)    Parameters below as of 13 Dec 2022 06:06  Patient On (Oxygen Delivery Method): room air        MEDICATIONS  (STANDING):  acetaminophen     Tablet .. 975 milliGRAM(s) Oral <User Schedule>  ampicillin  IVPB 1 Gram(s) IV Intermittent every 4 hours  diphtheria/tetanus/pertussis (acellular) Vaccine (Adacel) 0.5 milliLiter(s) IntraMuscular once  heparin   Injectable 5000 Unit(s) SubCutaneous every 12 hours  ibuprofen  Tablet. 600 milliGRAM(s) Oral every 6 hours  ketorolac   Injectable 30 milliGRAM(s) IV Push every 6 hours  lactated ringers. 1000 milliLiter(s) (125 mL/Hr) IV Continuous <Continuous>  oxytocin Infusion 333.333 milliUNIT(s)/Min (1000 mL/Hr) IV Continuous <Continuous>  oxytocin Infusion 41.667 milliUNIT(s)/Min (125 mL/Hr) IV Continuous <Continuous>      MEDICATIONS  (PRN):  diphenhydrAMINE 25 milliGRAM(s) Oral every 6 hours PRN Pruritus  lanolin Ointment 1 Application(s) Topical every 6 hours PRN Sore Nipples  magnesium hydroxide Suspension 30 milliLiter(s) Oral two times a day PRN Constipation  nalbuphine Injectable 2.5 milliGRAM(s) IV Push every 6 hours PRN Pruritus  naloxone Injectable 0.1 milliGRAM(s) IV Push every 3 minutes PRN For ANY of the following changes in patient status:  A. RR LESS THAN 10 breaths per minute, B. Oxygen saturation LESS THAN 90%, C. Sedation score of 6  ondansetron Injectable 4 milliGRAM(s) IV Push every 6 hours PRN Nausea  oxyCODONE    IR 5 milliGRAM(s) Oral every 3 hours PRN Moderate to Severe Pain (4-10)  simethicone 80 milliGRAM(s) Chew every 4 hours PRN Gas      Labs:  Blood type: O Positive  Rubella IgG: RPR: Negative                          10.1<L>   14.96<H> >-----------< 348    ( 12-11 @ 16:26 )             31.7<L>                        8.5<L>   9.25 >-----------< 303    ( 12-11 @ 06:30 )             27.2<L>                  PE:  General: NAD  Abdomen: Soft, appropriately tender, incision c/d/i.  Extremities: No erythema, no pitting edema

## 2022-12-13 NOTE — PROGRESS NOTE ADULT - ASSESSMENT
A/P: 28yo POD#2 s/p pCCS @25w2d under GETA 2/2 lack of relief from epidural, .  Patient is stable and doing well post-operatively.    - Continue regular diet.  - Increase ambulation.  - Continue motrin, tylenol, oxycodone PRN for pain control.  -  Hct stable 27.2->31.7    Portia Jha MD  OB/GYN PGY-1 A/P: 26yo POD#2 s/p pCCS @25w2d under GETA 2/2 incomplete relief from epidural, .  Patient is stable and doing well post-operatively.    - Continue regular diet.  - Increase ambulation.  - Continue motrin, tylenol, oxycodone PRN for pain control.  - Appropriate drop in Hct 31.7->25.4    Portia Jha MD  OB/GYN PGY-1

## 2022-12-14 LAB
HCT VFR BLD CALC: 26.8 % — LOW (ref 34.5–45)
HGB BLD-MCNC: 8.5 G/DL — LOW (ref 11.5–15.5)
MCHC RBC-ENTMCNC: 26.8 PG — LOW (ref 27–34)
MCHC RBC-ENTMCNC: 31.7 GM/DL — LOW (ref 32–36)
MCV RBC AUTO: 84.5 FL — SIGNIFICANT CHANGE UP (ref 80–100)
NRBC # BLD: 0 /100 WBCS — SIGNIFICANT CHANGE UP (ref 0–0)
NRBC # FLD: 0 K/UL — SIGNIFICANT CHANGE UP (ref 0–0)
PLATELET # BLD AUTO: 328 K/UL — SIGNIFICANT CHANGE UP (ref 150–400)
RBC # BLD: 3.17 M/UL — LOW (ref 3.8–5.2)
RBC # FLD: 13.8 % — SIGNIFICANT CHANGE UP (ref 10.3–14.5)
WBC # BLD: 7.2 K/UL — SIGNIFICANT CHANGE UP (ref 3.8–10.5)
WBC # FLD AUTO: 7.2 K/UL — SIGNIFICANT CHANGE UP (ref 3.8–10.5)

## 2022-12-14 RX ORDER — IBUPROFEN 200 MG
1 TABLET ORAL
Qty: 30 | Refills: 0
Start: 2022-12-14

## 2022-12-14 RX ADMIN — Medication 600 MILLIGRAM(S): at 02:03

## 2022-12-14 RX ADMIN — Medication 975 MILLIGRAM(S): at 02:33

## 2022-12-14 RX ADMIN — HEPARIN SODIUM 5000 UNIT(S): 5000 INJECTION INTRAVENOUS; SUBCUTANEOUS at 21:43

## 2022-12-14 RX ADMIN — Medication 975 MILLIGRAM(S): at 02:03

## 2022-12-14 RX ADMIN — Medication 600 MILLIGRAM(S): at 02:33

## 2022-12-14 NOTE — PROGRESS NOTE ADULT - SUBJECTIVE AND OBJECTIVE BOX
OB Postpartum Note:  Delivery, POD#3    S: 28yo POD#3 s/p LTCS. The patient feels well.  Pain is well controlled. She is tolerating a regular diet and passing flatus. She is voiding spontaneously, and ambulating without difficulty. Denies CP/SOB. Denies lightheadedness/dizziness. Denies N/V. Denies sxs of PEC: no headaches, visual changes, RUQ pain, and respiratory distress    O:  Vitals:  Vital Signs Last 24 Hrs  T(C): 36.5 (14 Dec 2022 05:58), Max: 36.7 (13 Dec 2022 10:13)  T(F): 97.7 (14 Dec 2022 05:58), Max: 98.1 (13 Dec 2022 21:30)  HR: 78 (14 Dec 2022 05:58) (77 - 94)  BP: 111/79 (14 Dec 2022 05:58) (98/64 - 115/77)  BP(mean): --  RR: 18 (14 Dec 2022 05:58) (16 - 18)  SpO2: 98% (14 Dec 2022 05:58) (98% - 99%)    Parameters below as of 14 Dec 2022 05:58  Patient On (Oxygen Delivery Method): room air        MEDICATIONS  (STANDING):  acetaminophen     Tablet .. 975 milliGRAM(s) Oral <User Schedule>  diphtheria/tetanus/pertussis (acellular) Vaccine (Adacel) 0.5 milliLiter(s) IntraMuscular once  heparin   Injectable 5000 Unit(s) SubCutaneous every 12 hours  ibuprofen  Tablet. 600 milliGRAM(s) Oral every 6 hours  oxytocin Infusion 333.333 milliUNIT(s)/Min (1000 mL/Hr) IV Continuous <Continuous>  oxytocin Infusion 41.667 milliUNIT(s)/Min (125 mL/Hr) IV Continuous <Continuous>    MEDICATIONS  (PRN):  diphenhydrAMINE 25 milliGRAM(s) Oral every 6 hours PRN Pruritus  lanolin Ointment 1 Application(s) Topical every 6 hours PRN Sore Nipples  magnesium hydroxide Suspension 30 milliLiter(s) Oral two times a day PRN Constipation  nalbuphine Injectable 2.5 milliGRAM(s) IV Push every 6 hours PRN Pruritus  naloxone Injectable 0.1 milliGRAM(s) IV Push every 3 minutes PRN For ANY of the following changes in patient status:  A. RR LESS THAN 10 breaths per minute, B. Oxygen saturation LESS THAN 90%, C. Sedation score of 6  ondansetron Injectable 4 milliGRAM(s) IV Push every 6 hours PRN Nausea  oxyCODONE    IR 5 milliGRAM(s) Oral every 3 hours PRN Moderate to Severe Pain (4-10)  simethicone 80 milliGRAM(s) Chew every 4 hours PRN Gas      LABS:  Blood type: O Positive  Rubella IgG: RPR: Negative                          7.8<L>   13.54<H> >-----------< 293    (  @ 05:55 )             25.4<L>                        10.1<L>   14.96<H> >-----------< 348    (  @ 16:26 )             31.7<L>                  Physical exam:  Gen: NAD  Abdomen: Soft, nontender, no distension , firm uterine fundus at umbilicus.  Incision: Clean, dry, and intact   Pelvic: Normal lochia noted  Ext: No calf tenderness         OB Postpartum Note:  Delivery, POD#3    S: 28yo POD#3 s/p pCCS. The patient feels well.  Pain is well controlled. She is tolerating a regular diet and passing flatus. She is voiding spontaneously, and ambulating without difficulty. Denies CP/SOB. Denies lightheadedness/dizziness. Denies N/V. Denies sxs of PEC: no headaches, visual changes, RUQ pain, and respiratory distress    O:  Vitals:  Vital Signs Last 24 Hrs  T(C): 36.5 (14 Dec 2022 05:58), Max: 36.7 (13 Dec 2022 10:13)  T(F): 97.7 (14 Dec 2022 05:58), Max: 98.1 (13 Dec 2022 21:30)  HR: 78 (14 Dec 2022 05:58) (77 - 94)  BP: 111/79 (14 Dec 2022 05:58) (98/64 - 115/77)  BP(mean): --  RR: 18 (14 Dec 2022 05:58) (16 - 18)  SpO2: 98% (14 Dec 2022 05:58) (98% - 99%)    Parameters below as of 14 Dec 2022 05:58  Patient On (Oxygen Delivery Method): room air        MEDICATIONS  (STANDING):  acetaminophen     Tablet .. 975 milliGRAM(s) Oral <User Schedule>  diphtheria/tetanus/pertussis (acellular) Vaccine (Adacel) 0.5 milliLiter(s) IntraMuscular once  heparin   Injectable 5000 Unit(s) SubCutaneous every 12 hours  ibuprofen  Tablet. 600 milliGRAM(s) Oral every 6 hours  oxytocin Infusion 333.333 milliUNIT(s)/Min (1000 mL/Hr) IV Continuous <Continuous>  oxytocin Infusion 41.667 milliUNIT(s)/Min (125 mL/Hr) IV Continuous <Continuous>    MEDICATIONS  (PRN):  diphenhydrAMINE 25 milliGRAM(s) Oral every 6 hours PRN Pruritus  lanolin Ointment 1 Application(s) Topical every 6 hours PRN Sore Nipples  magnesium hydroxide Suspension 30 milliLiter(s) Oral two times a day PRN Constipation  nalbuphine Injectable 2.5 milliGRAM(s) IV Push every 6 hours PRN Pruritus  naloxone Injectable 0.1 milliGRAM(s) IV Push every 3 minutes PRN For ANY of the following changes in patient status:  A. RR LESS THAN 10 breaths per minute, B. Oxygen saturation LESS THAN 90%, C. Sedation score of 6  ondansetron Injectable 4 milliGRAM(s) IV Push every 6 hours PRN Nausea  oxyCODONE    IR 5 milliGRAM(s) Oral every 3 hours PRN Moderate to Severe Pain (4-10)  simethicone 80 milliGRAM(s) Chew every 4 hours PRN Gas      LABS:  Blood type: O Positive  Rubella IgG: RPR: Negative                          7.8<L>   13.54<H> >-----------< 293    (  @ 05:55 )             25.4<L>                        10.1<L>   14.96<H> >-----------< 348    (  @ 16:26 )             31.7<L>                  Physical exam:  Gen: NAD  Abdomen: Soft, nontender, no distension , firm uterine fundus at umbilicus.  Incision: Clean, dry, and intact   Pelvic: Normal lochia noted  Ext: No calf tenderness

## 2022-12-14 NOTE — PROGRESS NOTE ADULT - ATTENDING COMMENTS
Agree with assesment and plan by PGY1. Patient seen and evaluated, doing well. Meeting all postoperative milestones. Lactating and bringing milk to  in NICU. Overall in good spirits. Plan for discharge POd#4.

## 2022-12-14 NOTE — PROGRESS NOTE ADULT - ATTENDING SUPERVISION STATEMENT
Fellow
Resident
Fellow
Resident
Resident

## 2022-12-14 NOTE — PROGRESS NOTE ADULT - ASSESSMENT
A/P: 26yo POD#3 s/p pCCS @25w2d under GETA 2/2 incomplete relief from epidural, .  Patient is stable and doing well post-operatively.    - Continue regular diet.  - Increase ambulation.  - Continue motrin, tylenol, oxycodone PRN for pain control.  - Appropriate drop in Hct 31.7->25.4    Portia Jha MD  OB/GYN PGY-1

## 2022-12-15 VITALS
RESPIRATION RATE: 18 BRPM | TEMPERATURE: 99 F | SYSTOLIC BLOOD PRESSURE: 115 MMHG | HEART RATE: 87 BPM | DIASTOLIC BLOOD PRESSURE: 79 MMHG | OXYGEN SATURATION: 100 %

## 2022-12-15 RX ADMIN — Medication 975 MILLIGRAM(S): at 13:31

## 2022-12-15 NOTE — PROGRESS NOTE ADULT - ASSESSMENT
A/P: 28yo POD#4 s/p primary classic c/s under GETA in the setting of non-reassuring FHT at 25.2wga. Patient is progressing appropriately post-operatively  - Continue motrin, tylenol, oxycodone PRN for pain control.  - Continue to encourage ambulation  - Continue regular diet    Kris Cleveland, PGY-1  Obstetrics and Gynecology

## 2022-12-15 NOTE — PROGRESS NOTE ADULT - SUBJECTIVE AND OBJECTIVE BOX
OB Postpartum Note:  Delivery, POD#4    S: 28yo POD#4 s/p primary classic c/s under GETA in the setting of non-reassuring FHT at 25.2wga. Pain controlled. She is tolerating a regular diet and passing flatus. She is voiding spontaneously, and ambulating without difficulty. Denies CP/SOB. Denies lightheadedness/dizziness. Denies N/V.    O:  Vitals:  Vital Signs Last 24 Hrs  T(C): 36.8 (15 Dec 2022 05:56), Max: 36.8 (14 Dec 2022 10:38)  T(F): 98.3 (15 Dec 2022 05:56), Max: 98.3 (15 Dec 2022 05:56)  HR: 92 (15 Dec 2022 05:56) (76 - 98)  BP: 105/64 (15 Dec 2022 05:56) (104/64 - 120/79)  BP(mean): --  RR: 18 (15 Dec 2022 05:56) (17 - 19)  SpO2: 99% (15 Dec 2022 05:56) (97% - 100%)    Parameters below as of 15 Dec 2022 05:56  Patient On (Oxygen Delivery Method): room air        MEDICATIONS  (STANDING):  acetaminophen     Tablet .. 975 milliGRAM(s) Oral <User Schedule>  diphtheria/tetanus/pertussis (acellular) Vaccine (Adacel) 0.5 milliLiter(s) IntraMuscular once  heparin   Injectable 5000 Unit(s) SubCutaneous every 12 hours  ibuprofen  Tablet. 600 milliGRAM(s) Oral every 6 hours  oxytocin Infusion 333.333 milliUNIT(s)/Min (1000 mL/Hr) IV Continuous <Continuous>  oxytocin Infusion 41.667 milliUNIT(s)/Min (125 mL/Hr) IV Continuous <Continuous>    MEDICATIONS  (PRN):  diphenhydrAMINE 25 milliGRAM(s) Oral every 6 hours PRN Pruritus  lanolin Ointment 1 Application(s) Topical every 6 hours PRN Sore Nipples  magnesium hydroxide Suspension 30 milliLiter(s) Oral two times a day PRN Constipation  nalbuphine Injectable 2.5 milliGRAM(s) IV Push every 6 hours PRN Pruritus  naloxone Injectable 0.1 milliGRAM(s) IV Push every 3 minutes PRN For ANY of the following changes in patient status:  A. RR LESS THAN 10 breaths per minute, B. Oxygen saturation LESS THAN 90%, C. Sedation score of 6  ondansetron Injectable 4 milliGRAM(s) IV Push every 6 hours PRN Nausea  oxyCODONE    IR 5 milliGRAM(s) Oral every 3 hours PRN Moderate to Severe Pain (4-10)  simethicone 80 milliGRAM(s) Chew every 4 hours PRN Gas      LABS:  Blood type: O Positive  Rubella IgG: RPR: Negative                          8.5<L>   7.20 >-----------< 328    (  @ 10:50 )             26.8<L>                        7.8<L>   13.54<H> >-----------< 293    (  @ 05:55 )             25.4<L>                  Physical exam:  Gen: NAD. Was sleeping comfortably prior to eval  Pulm: Unlabored breathing. No respiratory distress  Abdomen: Soft. Non-tender. Non-distended. Firm fundus   Incision: Clean, dry, and intact   Ext: No calf tenderness bilaterally

## 2022-12-21 LAB — SURGICAL PATHOLOGY STUDY: SIGNIFICANT CHANGE UP

## 2023-02-17 ENCOUNTER — APPOINTMENT (OUTPATIENT)
Dept: OBGYN | Facility: CLINIC | Age: 28
End: 2023-02-17
Payer: COMMERCIAL

## 2023-02-17 VITALS
SYSTOLIC BLOOD PRESSURE: 125 MMHG | DIASTOLIC BLOOD PRESSURE: 85 MMHG | HEART RATE: 68 BPM | WEIGHT: 167.13 LBS | BODY MASS INDEX: 24.75 KG/M2 | HEIGHT: 69 IN

## 2023-02-17 PROCEDURE — 0503F POSTPARTUM CARE VISIT: CPT

## 2023-02-17 RX ORDER — ONDANSETRON 8 MG/1
8 TABLET, ORALLY DISINTEGRATING ORAL
Qty: 24 | Refills: 1 | Status: DISCONTINUED | COMMUNITY
Start: 2021-05-14 | End: 2023-02-17

## 2023-02-17 RX ORDER — METRONIDAZOLE 500 MG/1
500 TABLET ORAL TWICE DAILY
Qty: 14 | Refills: 0 | Status: DISCONTINUED | COMMUNITY
Start: 2022-11-25 | End: 2023-02-17

## 2023-02-17 RX ORDER — HYDROCORTISONE ACETATE 25 MG/1
25 SUPPOSITORY RECTAL TWICE DAILY
Qty: 1 | Refills: 1 | Status: DISCONTINUED | COMMUNITY
Start: 2021-12-10 | End: 2023-02-17

## 2023-02-17 RX ORDER — PROGESTERONE 200 MG/1
200 CAPSULE ORAL
Qty: 90 | Refills: 1 | Status: DISCONTINUED | COMMUNITY
Start: 2022-11-03 | End: 2023-02-17

## 2023-02-17 NOTE — HISTORY OF PRESENT ILLNESS
[Postpartum Follow Up] : postpartum follow up [Complications:___] : complications include: [unfilled] [Delivery Date: ___] : on [unfilled] [Primary C/S] : delivered by  section [Female] : Delivery History: baby girl [Wt. ___] : weighing [unfilled] [Girl] : baby is a girl [Infant's Name ___] : [unfilled] [___ Lbs] : [unfilled] lbs [___ Oz] : [unfilled] oz [NICU Admission] : NICU admission [Hospital] : is currently in the hospital [Resumed [place]] : has resumed intercourse [Breastfeeding] : currently nursing [Clean/Dry/Intact] : clean, dry and intact [Awake] : awake [Alert] : alert [Soft] : soft [Oriented x3] : oriented to person, place, and time [Normal] : external genitalia [Doing Well] : is doing well [No Sign of Infection] : is showing no signs of infection [Excellent Pain Control] : has excellent pain control [None] : None [Rhogam] : Rhogam was not administered [Rubella Vaccine] : Rubella vaccine was not administered [Pertussis Vaccine] : Pertussis vaccine was not administered [BTL] : no tubal ligation [Resumed Menses] : has not resumed her menses [Intended Contraception] : the patient does not intended to use contraception postpartum [Erythema] : not erythematous [Swelling] : not swollen [Dehiscence] : not dehisced [Acute Distress] : no acute distress [Mass] : no breast mass [Nipple Discharge] : no nipple discharge [Axillary LAD] : no axillary lymphadenopathy [Tender] : non tender [Distended] : not distended [Depressed Mood] : not depressed [Flat Affect] : affect not flat [Motion Tenderness] : there was no cervical motion tenderness [Tenderness] : nontender [Adnexa Tenderness] : were not tender [FreeTextEntry8] : This 28 yo  presents for PPV after PPROM at 23 6/7 weeks gestation with PTL and delivery at 25 weeks 2/7 weeks gestation via C/S due to  tracing; pt busy with trips to NICU, school and work, agrees to talk to someone, good support at home, voiding and stooling without issue, considering contraception options. [de-identified] : premature [de-identified] : Stable PPV;Postpartum stress [de-identified] : Will contact Johanne Shane to f/u with pt; Billing Dept tasked for IUD coverage- literature in hand; RTO prn or for annual in 4 months

## 2023-02-27 ENCOUNTER — TRANSCRIPTION ENCOUNTER (OUTPATIENT)
Age: 28
End: 2023-02-27

## 2023-03-20 NOTE — PROGRESS NOTE ADULT - SUBJECTIVE AND OBJECTIVE BOX
[facility] Daily Progress Note    Patient: Mann Berry Date: 3/20/2023   : 1969 Attending: Rebeca Mauricio DO   Admit Date: 3/17/2023 Room/Bed: [place] [address]   54 year old female        SUBJECTIVE:  Patient seen and examined twice today.  Initially resting in combilizer, in NAD. Per RN, recently received morphine. Drowsy but will briefly open eyes and follow commands on right side.   Again seen later in the morning, family at bedside. Again resting but per  he feels she was much more awake today than yesterday.   CTH this am with redistributed hemorrhage, stable ventricle size. Na 131.       ROS:  Review of Systems   Unable to perform ROS: Other   Drowsy.      OBJECTIVE:    Medications/Infusions:  Scheduled:   Current Facility-Administered Medications   Medication Dose Route Frequency Provider Last Rate Last Admin   • amLODIPine (NORVASC) tablet 10 mg  10 mg Oral Daily IRASEMA Maza   10 mg at 23   • insulin glargine (LANTUS) injection 20 Units  20 Units Subcutaneous Nightly Danish Castellanos CNP       • insulin lispro (ADMELOG,HumaLOG) - Correction Dose   Subcutaneous TID WC IRASEMA Maza   6 Units at 23   • heparin (porcine) injection 5,000 Units  5,000 Units Subcutaneous 3 times per day Antoinette Harley CNP   5,000 Units at 2338   • lidocaine (LIDOCARE) 4 % patch 2 patch  2 patch Transdermal Daily IRASEMA Maza   2 patch at 23   • iohexol (OMNIPAQUE 350 INJECT) contrast solution 500 mL  500 mL Intravenous Once Magdalena Handley MD       • sodium chloride (PF) 0.9 % injection 2 mL  2 mL Intracatheter 2 times per day Sinai Whiting NP   2 mL at 23   • docusate sodium (COLACE) capsule 200 mg  200 mg Oral Daily Sinai Whiting NP   200 mg at 23   • Potassium Standard Replacement Protocol (Levels 3.5 and lower)   Does not apply See Admin Instructions Sinai Whiting NP       • Potassium Replacement (Levels 3.6  - 4)   Does not apply See Admin Instructions Sinai Whiting NP       • Magnesium Standard Replacement Protocol   Does not apply See Admin Instructions Sinai Whiting NP       • Phosphorus Standard Replacement Protocol   Does not apply See Admin Instructions Sinai Whiting NP       • levothyroxine (SYNTHROID, LEVOTHROID) tablet 150 mcg  150 mcg Oral QAM AC Sinai Whiting NP   150 mcg at 03/20/23 0538       PRN:    Current Facility-Administered Medications   Medication Dose Route Frequency Provider Last Rate Last Admin   • atropine injection 1 mg  1 mg Intravenous PRN IRASEMA Maza       • butalbital-APAP-caffeine (FIORICET) -40 MG tablet 1 tablet  1 tablet Oral Q4H PRN Juana Cornell CNP   1 tablet at 03/20/23 0622   • acetaminophen (TYLENOL) tablet 650 mg  650 mg Oral Q4H PRN Sinai Whiting NP   650 mg at 03/20/23 0750    Or   • acetaminophen (TYLENOL) 160 MG/5ML solution 650 mg  650 mg Per NG Tube Q4H PRN Sinai Whiting NP        Or   • acetaminophen (TYLENOL) suppository 650 mg  650 mg Rectal Q4H PRN Sinai Whiting NP       • sodium chloride 0.9 % flush bag 25 mL  25 mL Intravenous PRN Sinai Whiting NP       • sodium chloride 0.9 % flush bag 25 mL  25 mL Intravenous PRN Sinai Whiting NP       • [Held by provider] labetalol (NORMODYNE) injection 10 mg  10 mg Intravenous Q4H PRN Sinai Whiting NP   10 mg at 03/18/23 1333   • hydrALAZINE (APRESOLINE) injection 10 mg  10 mg Intravenous Q4H PRN Sinai Whiting NP   10 mg at 03/20/23 0541   • ondansetron (ZOFRAN ODT) disintegrating tablet 4 mg  4 mg Oral Q12H PRN Sinai Whiting NP        Or   • ondansetron (ZOFRAN) injection 4 mg  4 mg Intravenous Q12H PRN Sinai Whiting NP       • polyethylene glycol (MIRALAX) packet 17 g  17 g Oral Daily PRN Sinai Whiting NP       • dextrose 50 % injection 25 g  25 g Intravenous PRN Sinai Whiting NP       • dextrose 50 % injection 12.5 g  12.5 g Intravenous PRN Sinai Whiting NP       • glucagon (GLUCAGEN) injection 1 mg  1 mg Intramuscular PRN  R3 Antepartum Note,       Patient seen and examined at bedside, no acute overnight events. No acute complaints. Pt reports +FM, denies LOF, VB, ctx, HA, epigastric pain, blurred vision, CP, SOB, N/V, fevers, and chills.    Vital Signs Last 24 Hours  T(C): 36.8 (12-07-22 @ 06:15), Max: 37.3 (12-06-22 @ 17:46)  HR: 89 (12-07-22 @ 06:15) (84 - 120)  BP: 107/64 (12-07-22 @ 06:15) (107/64 - 138/85)  RR: 17 (12-07-22 @ 06:15) (16 - 18)  SpO2: 100% (12-07-22 @ 06:15) (81% - 100%)    CAPILLARY BLOOD GLUCOSE          Physical Exam:  General: NAD  Abdomen: Soft, non-tender, gravid  Ext: No pain or swelling    Labs:             9.5    16.97 )-----------( 345      ( 12-06 @ 11:46 )             29.5     12-05 @ 19:00    137  |  99  |  8   ----------------------------<  90  4.2   |  25  |  0.67    Ca    10.0      12-05 @ 19:00    TPro  7.7  /  Alb  4.1  /  TBili  <0.2  /  DBili  x   /  AST  28  /  ALT  73  /  AlkPhos  69  12-05 @ 19:00            MEDICATIONS  (STANDING):  amoxicillin 500 milliGRAM(s) Oral every 8 hours  ascorbic acid 500 milliGRAM(s) Oral daily  ferrous    sulfate 325 milliGRAM(s) Oral daily  heparin   Injectable 5000 Unit(s) SubCutaneous every 12 hours  prenatal multivitamin 1 Tablet(s) Oral daily    MEDICATIONS  (PRN):   Sinai Johanne, NP       • dextrose (GLUTOSE) 40 % gel 15 g  15 g Oral PRN Sinai Whiting NP       • dextrose (GLUTOSE) 40 % gel 30 g  30 g Oral PRN Sinai Whiting NP              Vital Last Value 24 Hour Range   Temperature 98.4 °F (36.9 °C) (03/20/23 0800) Temp  Min: 97.9 °F (36.6 °C)  Max: 98.8 °F (37.1 °C)   Pulse 76 (03/20/23 0600) Pulse  Min: 49  Max: 82   Respiratory (!) 32 (03/20/23 0600) Resp  Min: 10  Max: 32   Non-Invasive  Blood Pressure (!) 188/98 (03/20/23 0541) BP  Min: 113/82  Max: 188/98   Arterial   Blood Pressure   No data recorded   Pulse Oximetry 97 % (03/20/23 0600) SpO2  Min: 94 %  Max: 98 %       Intake/Output:      Intake/Output Summary (Last 24 hours) at 3/20/2023 0935  Last data filed at 3/20/2023 0059  Gross per 24 hour   Intake 300 ml   Output 500 ml   Net -200 ml     Bowel movements        Physical Exam:   Constitutional:  No acute distress.    Integument:  Warm. Dry. No erythema.  HENT:  Normocephalic. Atraumatic.  Eyes:  PERRL, EOM intact  Neck: Normal range of motion. No tenderness. Supple.   Cardiac:  Peripheral perfusion appears normal.  No edema  GI:  Soft, NTTP.  Respiratory:  No respiratory distress noted.  Neuro:  Resting but awakens to light stimulation. States name, otherwise does not respond to questions. Follows commands. L facial droop. PERRL, EOMI. Tongue midline. RUE/RLE 5/5, L hemibody flickers to pain, decreased sensation L hemibody   Psych:  Drowsy but cooperative        Laboratory Results:  CBC  Recent Labs   Lab 03/20/23  0504 03/19/23  0428 03/18/23  0256   WBC 6.5 9.5 7.1   HCT 44.0 43.7 40.9   HGB 14.2 14.0 13.0    147 194       CMP  Recent Labs   Lab 03/20/23  0504 03/19/23  0428 03/18/23  0256 03/17/23  1826   SODIUM 131* 134* 136 136   POTASSIUM 3.7 3.9 3.9 3.6   CHLORIDE 102 103 107 106   CO2 24 22 25 24   GLUCOSE 205* 253* 189* 186*   BUN 16 12 10 12   CREATININE 0.79 0.78 0.71 0.79   CALCIUM 9.1 9.7 9.2 9.5   TOTPROTEIN  --   --  8.1 7.8   ALBUMIN  --   --   4.2 4.1   BILIRUBIN  --   --  0.6 0.4   AST  --   --  18 15   GPT  --   --  28 26   ALKPT  --   --  103 98       Coags  Recent Labs   Lab 03/18/23  0601 03/17/23  1826   INR 1.1 1.0   PTT 26 22         Microbiology:  Microbiology Results     None             Imaging:   CT HEAD WO CONTRAST    Result Date: 3/18/2023  EXAMINATION: Computed tomography (CT) of the head without contrast HISTORY: 54 years Female Neuro deficit, acute, stroke suspected TECHNIQUE: CT of the head was performed without contrast according to standard protocol. COMPARISON: CT head 03/17/2023 FINDINGS: Interval evolution of acute intraparenchymal hemorrhage within the right thalamus extending to the midbrain with intraventricular extension of hemorrhage, grossly unchanged in comparison to prior examination.  Mild prominence of the right ventricular occipital/temporal horn, stable. Localized mass effect with partial effacement of the third ventricle. Basal cisterns remain patent.  No new areas of hemorrhage.  Gray-white differentiation is otherwise intact.  Mastoid air cells are clear paranasal sinuses are clear.  Orbits are unremarkable.     1.   Interval evolution of acute intraparenchymal hemorrhage in the right thalamus with intraventricular extension. 2.   Stable prominence of the right temporal/occipital horn which may represent ventricular trapping, unchanged from prior CT 03/17/2023. Otherwise, nondilated caliber of the ventricular system. Electronically Signed by: AMADOR LONGORIA MD Signed on: 3/18/2023 8:52 AM Workstation ID: [id number]    CTA HEAD AND NECK W CONTRAST LEVEL 1    Result Date: 3/17/2023  EXAM: CTA HEAD AND NECK W CONTRAST LEVEL 1 HISTORY: Neuro deficit, acute, stroke suspected Stroke Alert TECHNIQUE: CT angiography of the head and neck was obtained after the uneventful administration of 65 mL Omnipaque 350 intravenous contrast. Three dimensional postprocessing was performed by the technologist and sent to the  R3 Antepartum Note,       Patient seen and examined at bedside, no acute overnight events. No acute complaints. Pt reports +FM, denies LOF, VB, ctx, HA, epigastric pain, blurred vision, CP, SOB, N/V, fevers, and chills.    Vital Signs Last 24 Hours  T(C): 36.8 (12-07-22 @ 06:15), Max: 37.3 (12-06-22 @ 17:46)  HR: 89 (12-07-22 @ 06:15) (84 - 120)  BP: 107/64 (12-07-22 @ 06:15) (107/64 - 138/85)  RR: 17 (12-07-22 @ 06:15) (16 - 18)  SpO2: 100% (12-07-22 @ 06:15) (81% - 100%)          Physical Exam:  General: NAD  Abdomen: Soft, non-tender, gravid  Ext: No pain or swelling    Labs:             9.5    16.97 )-----------( 345      ( 12-06 @ 11:46 )             29.5     12-05 @ 19:00    137  |  99  |  8   ----------------------------<  90  4.2   |  25  |  0.67    Ca    10.0      12-05 @ 19:00    TPro  7.7  /  Alb  4.1  /  TBili  <0.2  /  DBili  x   /  AST  28  /  ALT  73  /  AlkPhos  69  12-05 @ 19:00            MEDICATIONS  (STANDING):  amoxicillin 500 milliGRAM(s) Oral every 8 hours  ascorbic acid 500 milliGRAM(s) Oral daily  ferrous    sulfate 325 milliGRAM(s) Oral daily  heparin   Injectable 5000 Unit(s) SubCutaneous every 12 hours  prenatal multivitamin 1 Tablet(s) Oral daily    MEDICATIONS  (PRN):   workstation for review. COMPARISON: CT head exam obtained earlier in the day FINDINGS: Non-angiographic findings: No significant change in the size or contours of the 2.9 cm right thalamic acute intraparenchymal hematoma with intraventricular extension, 4 mm leftward midline shift, and enlargement of the right temporal horn concerning for entrapment. No soft tissue abnormalities are identified in the neck. Angiographic findings: The visualized aortic arch appears normal. The configuration of the brachiocephalic vessels is typical. The innominate artery and both subclavian arteries appear normal. The common carotid arteries appear normal. The carotid bifurcations appear normal. The cervical internal carotid arteries appear normal. The cervical vertebral arteries appear normal. Evaluation of the intracranial arterial vasculature limited by venous contamination. The distal internal carotid arteries appear normal. The anterior and middle cerebral arteries appear normal. The distal vertebral arteries appear normal. The basilar artery and posterior cerebral arteries appear normal. No aneurysms, vascular occlusions, or intracranial stenoses are identified. No vascular malformations.     No vascular malformation or other etiology identified to explain the patient's acute intracranial hemorrhage. No large arterial occlusions or significant stenoses identified in the head or neck. Stable size or contours of the 2.9 cm right thalamic acute intraparenchymal hematoma with intraventricular extension, 4 mm leftward midline shift, and enlargement of the right temporal horn concerning for entrapment. Electronically Signed by: MARGOT TRAN M.D. Signed on: 3/17/2023 8:18 PM Workstation ID: [id number]    CT HEAD LEVEL 1    Result Date: 3/17/2023  EXAM: CT HEAD LEVEL 1 CLINICAL INDICATION: Headache, intracranial hemorrhage suspected Neuro deficit, acute, stroke suspected Dizziness, non-specific Mental status change, unknown  cause Stroke Alert. COMPARISON: None. TECHNIQUE: Axial CT images of the head were obtained from the base of the skull to the vertex. No contrast was administered. Sagittal and coronal reformats were created. FINDINGS: Acute intraparenchymal hematoma centered within the right thalamus measuring up to 2.9 x 1.8 cm in axial dimension.  Mass effect results in midline shift of approximately 4 mm and partial effacement of the right lateral ventricle body.  There is dissection into the ventricular system, with large amount of blood in the right greater than left lateral ventricles as well as the 3rd and 4th ventricles.  Asymmetric enlargement of the right temporal horn is concerning for entrapment.. Scalp is unremarkable. The calvarium is intact. Gray-white differentiation is otherwise preserved. Age-appropriate appearance of the ventricles and sulci. Basal cisterns are patent. No more than mild scattered mucosal thickening within the visualized paranasal sinuses. Mastoid air cells are clear. No acute abnormality identified in the orbits.     Acute 2.9 cm intraparenchymal hematoma within the right thalamus with 4 mm leftward midline shift, partial effacement of the right lateral ventricle body, and large intraventricular extension. Asymmetric enlargement of the right temporal horn is concerning for entrapment.  Urgent neurosurgical consultation recommended. KEY FINDINGS WERE COMMUNICATED BY DR. TRAN TO DR. ANITA WNYN AT 3/17/2023 6:50 PM. Electronically Signed by: MARGOT TRAN M.D. Signed on: 3/17/2023 6:57 PM Workstation ID: [id number]  '      IMPRESSION/PLAN:  55 yo female with PMHx of DM, HTN, hypothyroid presents for evaluation of right  Thalamic hemorrhage with IVH. Patient reports that she was a the mall and had sudden onset of severe HA, N/V, and left sided weakness. LKN 6:06 PM. Patient explains that she called her daughter who called [facility]. Patient was [facility] ED via EMS. Per ED staff,  patient was vomiting upon arrival.      CTH 3/17/23 showing right thalamic hemorrhage with IVH, 4 mm leftward midline shift  CTA 3/17/23 No vascular malformation or other etiology identified to explain the patient's acute intracranial hemorrhage. No large arterial occlusions or significant stenoses identified in the head or neck.  3/18 rCTH stable  3/19 rCTH stable     Dx: thalamic hemorrhage, IVH   ICH score 1   NIH score 12    3/20 rCTH with redistribution of hemorrhage, no change in ventricle size     Plan:  - No emergent surgical intervention recommended at this time. We will continue to follow closely for need of EVD  - Serial neuro exams, notify if change or if patient stops following commands  - rCTH in am, sooner of clinical change  - Hyponatremia, management per primary  - Pain control PRN   - ICP precautions: neck midline, HOB >30, HTS per ICU, pain control  - Maintain SBP <140  - No diet or activity restrictions  - Ppx: SCDs, ok for chemo ppx  - Dispo: NCCU       Reviewed imaging and discussed with Dr. Cara Mcguire.

## 2023-04-03 ENCOUNTER — TRANSCRIPTION ENCOUNTER (OUTPATIENT)
Age: 28
End: 2023-04-03

## 2023-04-20 ENCOUNTER — TRANSCRIPTION ENCOUNTER (OUTPATIENT)
Age: 28
End: 2023-04-20

## 2023-06-09 NOTE — OB RN DELIVERY SUMMARY - NS_LABORCHARACTER_OBGYN_ALL_OB
Caller: ALEENA VALE    Relationship: Mother    Best call back number: [phone]     What is the best time to reach you: ANYTIME    Who are you requesting to speak with (clinical staff, provider,  specific staff member): CLINICAL        What was the call regarding: MOM IS REPORTING THAT YESI IS STILL HAVING NAUSEA SPELLS AND DID 2 THINGS THIS WEEK AND FELT LIKE SHE WAS GOING TO THROW UP. SHE TOOK THE MEDICATION YOU PRESCRIBED AND DID EAT SOMETHING WITH IT AS RECOMMENDED BUT IS STILL EXPERIENCING THE SAME SYMPTOMS. MOM WOULD LIKE TO KNOW THE NEXT STEPS TO TAKE.      ALSO SHE DID SOCCER AND TRACK THIS WEEK AND FELT THESE SYMPTOMS.    Is it okay if the provider responds through MyChart: NO       External electronic FM/Antibiotics in labor/Steroids in labor

## 2023-06-20 ENCOUNTER — APPOINTMENT (OUTPATIENT)
Dept: OBGYN | Facility: CLINIC | Age: 28
End: 2023-06-20
Payer: COMMERCIAL

## 2023-06-20 VITALS
HEART RATE: 79 BPM | WEIGHT: 175 LBS | HEIGHT: 69 IN | DIASTOLIC BLOOD PRESSURE: 82 MMHG | BODY MASS INDEX: 25.92 KG/M2 | SYSTOLIC BLOOD PRESSURE: 130 MMHG

## 2023-06-20 DIAGNOSIS — Z01.419 ENCOUNTER FOR GYNECOLOGICAL EXAMINATION (GENERAL) (ROUTINE) W/OUT ABNORMAL FINDINGS: ICD-10-CM

## 2023-06-20 PROCEDURE — 99395 PREV VISIT EST AGE 18-39: CPT

## 2023-06-21 NOTE — H&P PST ADULT - ENMT
[FreeTextEntry1] : INGRID BURRIS is a 70 year old woman who presents with advanced OP as seen on MRI with multiple atraumatic spinal compression fx with severe loss of height. Used Fosamax in the past but has esophageal motility issues which preclude it presently, trialled one dose of Prolia but had subsequent fracture, so transitioned to Evenity SQ with prior Endo, Dr Constantine Cabrera x 5 doses, then completed the full 12 months with me, after which MRI appears stable. \par \par + strong FH of severe OP with fragility fx, no tobacco, recurrent steroids for bronchitis, no inflammatory arthritis\par \par + OA with nodules over DIPs in b/l hands which are painful, inquiring about possible meds as cannot take PO NSAIDs 2/2 GI issues. \par \par ----------\par 1/28/21 -- 2 weeks of R sided buttock with radiation to the groin, occasional radicular sx, occasional groin numbness, occasional R foot numbness. No preceding trauma. Initial few steps from arising with limp, but walking improves thereafter. Some lower spine worsening pain as well. No falls. Ongoing suprapubic pressure/mild pain, no dysuria. + R anterior shin pain chronically. Ongoing dental sx for implants, OP meds on hold for this. \par \par 3/16/21 -- Worsening hip, buttock pain on R which then moved to L as well with limited ROM in interim, MRI pelvis with b/l sacral insufficiency fx. Presently can only ambulate short distances with a walker, pain with lying flat, able to sit without severe pain. S/p bone graft but has not had implant placed yet. PT on hold given MRI results. \par \par 11/9/21 -- Has been tolerating Prolia well, due for next dose today. No new fractures since last visit. Worsening R hip pain in setting of known advanced OA, planning for possible THR, pain not too severe with ambulation but very severe at night, asking about possible medication mgmt until sx is completed. Surgeon is trying to ID if its back, sacral and/or hip pain to better determine which sx is indicated. Dental implants also on hold 2/2 Prolia, asking about timeline. Has stopped taking supplemental Ca/Vit D as feels its too constipating, trying to get it in her diet. \par \par 5/12/22 -- Here for Prolia injection but very worried it might not be effective and wanting to discuss how to best assess and other options if needed. No trips/falls since last visit, ongoing R hip pain and prepping for surgery now, pain radiating down to femur, knee and shin and she is very worried she has a fracture along these areas. DEXA results were raised at surgery visit which has also worried her. \par \par 6/20/23 -- Last Prolia May 2022 as pt decided she didn't feel it was helping. Progressive debility 2/2 unable to bear weight on R hip, needs walker at all times, saw [facility], neuro and had repeat full w/u with MRIs again showing severe DJD/compression in spine diffusely and pt has lost some more height, as well as advanced R hip OA, EMG negative for neurologic process. Ortho advised sx but also cautioned her about poor possible outcomes 2/2 her comorbidities, so she has held off. Newer upper back/mid back radiating to rib pain for which she returns.  no gross abnormalities/neck negative

## 2023-07-14 LAB
C TRACH RRNA SPEC QL NAA+PROBE: NOT DETECTED
N GONORRHOEA RRNA SPEC QL NAA+PROBE: NOT DETECTED
SOURCE AMPLIFICATION: NORMAL

## 2023-08-13 NOTE — PROGRESS NOTE ADULT - ATTENDING COMMENTS
Pt seen and evaluated at bedside  Agree with above  P0 at 24w5d with PPROM, cerclage in place  Pt without complaints, denies subjective fevers/chills, denies abdominal pain  WBC noted to increase to 16, will repeat at noon  Closely monitor temperature cure    omero JONES 3

## 2023-11-22 NOTE — OB RN PATIENT PROFILE - NS_PREOPBLOODCONS_OBGYN_ALL_OB
Pt arrives to ER with c/o GLF, pt reports she tripped over something and fell and hit her left hand, did not hit head just PTA, no OTC medications were taken. . Pt report she did not feel lightheaded or dizzy.
n/a

## 2024-06-25 NOTE — OB PROVIDER H&P - NS_SONODONE_OBGYN_ALL_OB
Support and resources for [facility] to enroll in health coverage and other programs they may be eligible for.  Phone Number: [phone]  [email or web address]  Under Healthcare Savings tab, Search for Savings by Diagnosis then by Search by Diagnosis for all savings and discounts    [facility] [facility]:  What they offer: reducing medication cost, hearing aids assistance, reduce fee for dental services, enrollment in Medicaid & [employer], Medicare D Information, chronic disease self-management class, etc.  Phone Number:   [facility]; [address]; [phone]  [facility]; [address] [place]; [phone]  [facility]; [address]; [phone]    UTILITY ASSISTANCE:  [facility]; [address]; [phone]  [facility]; [address] [place] [address]; [phone]   [employer]  [facility]; [address]; [phone]  [facility]: [facility], [place]; [phone]  
Yes

## 2024-10-29 ENCOUNTER — ASOB RESULT (OUTPATIENT)
Age: 29
End: 2024-10-29

## 2024-10-29 ENCOUNTER — APPOINTMENT (OUTPATIENT)
Dept: OBGYN | Facility: CLINIC | Age: 29
End: 2024-10-29
Payer: MEDICAID

## 2024-10-29 ENCOUNTER — APPOINTMENT (OUTPATIENT)
Dept: OBGYN | Facility: CLINIC | Age: 29
End: 2024-10-29

## 2024-10-29 VITALS
HEART RATE: 98 BPM | BODY MASS INDEX: 25.77 KG/M2 | HEIGHT: 69 IN | SYSTOLIC BLOOD PRESSURE: 122 MMHG | DIASTOLIC BLOOD PRESSURE: 76 MMHG | WEIGHT: 174 LBS

## 2024-10-29 DIAGNOSIS — Z01.419 ENCOUNTER FOR GYNECOLOGICAL EXAMINATION (GENERAL) (ROUTINE) W/OUT ABNORMAL FINDINGS: ICD-10-CM

## 2024-10-29 DIAGNOSIS — Z32.01 ENCOUNTER FOR PREGNANCY TEST, RESULT POSITIVE: ICD-10-CM

## 2024-10-29 LAB — HCG UR QL: POSITIVE

## 2024-10-29 PROCEDURE — 99395 PREV VISIT EST AGE 18-39: CPT | Mod: 25

## 2024-10-29 PROCEDURE — 99459 PELVIC EXAMINATION: CPT

## 2024-10-29 PROCEDURE — 81025 URINE PREGNANCY TEST: CPT

## 2024-10-29 PROCEDURE — 76817 TRANSVAGINAL US OBSTETRIC: CPT

## 2024-10-30 LAB
C TRACH RRNA SPEC QL NAA+PROBE: NOT DETECTED
N GONORRHOEA RRNA SPEC QL NAA+PROBE: NOT DETECTED
SOURCE TP AMPLIFICATION: NORMAL

## 2024-11-01 LAB — CYTOLOGY CVX/VAG DOC THIN PREP: ABNORMAL

## 2024-11-12 ENCOUNTER — LABORATORY RESULT (OUTPATIENT)
Age: 29
End: 2024-11-12

## 2024-11-12 ENCOUNTER — APPOINTMENT (OUTPATIENT)
Dept: OBGYN | Facility: CLINIC | Age: 29
End: 2024-11-12
Payer: MEDICAID

## 2024-11-12 VITALS
SYSTOLIC BLOOD PRESSURE: 127 MMHG | DIASTOLIC BLOOD PRESSURE: 80 MMHG | HEART RATE: 72 BPM | WEIGHT: 172 LBS | BODY MASS INDEX: 25.48 KG/M2 | HEIGHT: 69 IN

## 2024-11-12 DIAGNOSIS — Z34.81 ENCOUNTER FOR SUPERVISION OF OTHER NORMAL PREGNANCY, FIRST TRIMESTER: ICD-10-CM

## 2024-11-12 DIAGNOSIS — O26.872 CERVICAL SHORTENING, SECOND TRIMESTER: ICD-10-CM

## 2024-11-12 PROCEDURE — 99213 OFFICE O/P EST LOW 20 MIN: CPT | Mod: TH

## 2024-11-16 LAB
ABO + RH PNL BLD: NORMAL
ALBUMIN SERPL ELPH-MCNC: 4.3 G/DL
ALP BLD-CCNC: 45 U/L
ALT SERPL-CCNC: 8 U/L
ANION GAP SERPL CALC-SCNC: 18 MMOL/L
AST SERPL-CCNC: 15 U/L
B19V IGG SER QL IA: 4.96 INDEX
B19V IGG+IGM SER-IMP: NORMAL
B19V IGG+IGM SER-IMP: POSITIVE
B19V IGM FLD-ACNC: 0.25 INDEX
B19V IGM SER-ACNC: NEGATIVE
BACTERIA UR CULT: NORMAL
BILIRUB SERPL-MCNC: 0.2 MG/DL
BLD GP AB SCN SERPL QL: NORMAL
BUN SERPL-MCNC: 11 MG/DL
CALCIUM SERPL-MCNC: 9.9 MG/DL
CHLORIDE SERPL-SCNC: 100 MMOL/L
CO2 SERPL-SCNC: 18 MMOL/L
CREAT SERPL-MCNC: 0.71 MG/DL
EGFR: 119 ML/MIN/1.73M2
ESTIMATED AVERAGE GLUCOSE: 105 MG/DL
GLUCOSE SERPL-MCNC: 46 MG/DL
HBA1C MFR BLD HPLC: 5.3 %
HBV SURFACE AG SER QL: NONREACTIVE
HCT VFR BLD CALC: 35.9 %
HCV AB SER QL: NONREACTIVE
HCV S/CO RATIO: 0.13 S/CO
HGB BLD-MCNC: 11.4 G/DL
HIV1+2 AB SPEC QL IA.RAPID: NONREACTIVE
LEAD BLD-MCNC: <1 UG/DL
MCHC RBC-ENTMCNC: 26.6 PG
MCHC RBC-ENTMCNC: 31.8 G/DL
MCV RBC AUTO: 83.9 FL
PLATELET # BLD AUTO: 375 K/UL
POTASSIUM SERPL-SCNC: 4.6 MMOL/L
PROT SERPL-MCNC: 7.3 G/DL
RBC # BLD: 4.28 M/UL
RBC # FLD: 14.9 %
RUBV IGG FLD-ACNC: 7.6 INDEX
RUBV IGG SER-IMP: POSITIVE
SODIUM SERPL-SCNC: 136 MMOL/L
T GONDII AB SER-IMP: NEGATIVE
T GONDII IGM SER QL: <3 AU/ML
T PALLIDUM AB SER QL IA: NEGATIVE
TSH SERPL-ACNC: 1.34 UIU/ML
VZV AB TITR SER: POSITIVE
VZV IGG SER IF-ACNC: 1.82 S/CO
WBC # FLD AUTO: 5.83 K/UL

## 2024-12-04 ENCOUNTER — APPOINTMENT (OUTPATIENT)
Dept: ANTEPARTUM | Facility: CLINIC | Age: 29
End: 2024-12-04

## 2024-12-06 ENCOUNTER — NON-APPOINTMENT (OUTPATIENT)
Age: 29
End: 2024-12-06

## 2024-12-17 ENCOUNTER — OUTPATIENT (OUTPATIENT)
Dept: OUTPATIENT SERVICES | Facility: HOSPITAL | Age: 29
LOS: 1 days | End: 2024-12-17
Payer: MEDICAID

## 2024-12-17 VITALS
SYSTOLIC BLOOD PRESSURE: 106 MMHG | RESPIRATION RATE: 17 BRPM | HEART RATE: 74 BPM | TEMPERATURE: 98 F | HEIGHT: 68 IN | OXYGEN SATURATION: 99 % | WEIGHT: 173.94 LBS | DIASTOLIC BLOOD PRESSURE: 71 MMHG

## 2024-12-17 DIAGNOSIS — O26.872 CERVICAL SHORTENING, SECOND TRIMESTER: ICD-10-CM

## 2024-12-17 DIAGNOSIS — Z98.891 HISTORY OF UTERINE SCAR FROM PREVIOUS SURGERY: Chronic | ICD-10-CM

## 2024-12-17 DIAGNOSIS — O09.299 SUPERVISION OF PREGNANCY WITH OTHER POOR REPRODUCTIVE OR OBSTETRIC HISTORY, UNSPECIFIED TRIMESTER: Chronic | ICD-10-CM

## 2024-12-17 LAB
BASOPHILS # BLD AUTO: 0.04 K/UL — SIGNIFICANT CHANGE UP (ref 0–0.2)
BASOPHILS NFR BLD AUTO: 0.7 % — SIGNIFICANT CHANGE UP (ref 0–2)
BLD GP AB SCN SERPL QL: POSITIVE — SIGNIFICANT CHANGE UP
EOSINOPHIL # BLD AUTO: 0.06 K/UL — SIGNIFICANT CHANGE UP (ref 0–0.5)
EOSINOPHIL NFR BLD AUTO: 1.1 % — SIGNIFICANT CHANGE UP (ref 0–6)
HCT VFR BLD CALC: 31.2 % — LOW (ref 34.5–45)
HGB BLD-MCNC: 10.1 G/DL — LOW (ref 11.5–15.5)
IMM GRANULOCYTES NFR BLD AUTO: 0.2 % — SIGNIFICANT CHANGE UP (ref 0–0.9)
LYMPHOCYTES # BLD AUTO: 1.75 K/UL — SIGNIFICANT CHANGE UP (ref 1–3.3)
LYMPHOCYTES # BLD AUTO: 31.8 % — SIGNIFICANT CHANGE UP (ref 13–44)
MCHC RBC-ENTMCNC: 26.5 PG — LOW (ref 27–34)
MCHC RBC-ENTMCNC: 32.4 G/DL — SIGNIFICANT CHANGE UP (ref 32–36)
MCV RBC AUTO: 81.9 FL — SIGNIFICANT CHANGE UP (ref 80–100)
MONOCYTES # BLD AUTO: 0.43 K/UL — SIGNIFICANT CHANGE UP (ref 0–0.9)
MONOCYTES NFR BLD AUTO: 7.8 % — SIGNIFICANT CHANGE UP (ref 2–14)
NEUTROPHILS # BLD AUTO: 3.22 K/UL — SIGNIFICANT CHANGE UP (ref 1.8–7.4)
NEUTROPHILS NFR BLD AUTO: 58.4 % — SIGNIFICANT CHANGE UP (ref 43–77)
PLATELET # BLD AUTO: 315 K/UL — SIGNIFICANT CHANGE UP (ref 150–400)
RBC # BLD: 3.81 M/UL — SIGNIFICANT CHANGE UP (ref 3.8–5.2)
RBC # FLD: 14.6 % — HIGH (ref 10.3–14.5)
RH IG SCN BLD-IMP: POSITIVE — SIGNIFICANT CHANGE UP
WBC # BLD: 5.51 K/UL — SIGNIFICANT CHANGE UP (ref 3.8–10.5)
WBC # FLD AUTO: 5.51 K/UL — SIGNIFICANT CHANGE UP (ref 3.8–10.5)

## 2024-12-17 PROCEDURE — 86077 PHYS BLOOD BANK SERV XMATCH: CPT

## 2024-12-17 NOTE — H&P PST ADULT - HISTORY OF PRESENT ILLNESS
29 year old G3 P 0111 female who is 16 week gestation with hx of incompetent cervix  to Presurgical testing with diagnosis of  cervical shortening second trimester scheduled for cerclage placement on 12/20/24. 29 year old G3 P 0111 female who is 16 week gestation with hx of incompetent cervix  to Presurgical testing with diagnosis of  cervical shortening second trimester scheduled for cerclage placement on 24.  Patient with hx of emergent  section at 25 weeks gestation.

## 2024-12-17 NOTE — H&P PST ADULT - CARDIOVASCULAR
normal/regular rate and rhythm/S1 S2 present/no gallops/no rub/no murmur details… regular rate and rhythm/S1 S2 present

## 2024-12-17 NOTE — H&P PST ADULT - GENERAL
04/26/19 1008   Group 1   Start Time 0830   Stop Time 0915   Length (min) 45 Min   Group Name check in    Focus of Group symptoms and goals    Attendance Not present   Griselda Wyatt, LPC-IT     negative

## 2024-12-17 NOTE — H&P PST ADULT - NS ATTEND AMEND GEN_ALL_CORE FT
R/A/B of cervical cerclage and EUA discussed with pt including but not limited to infection, bleeding, PPROM, and SAB.  All questions and concerns were addressed

## 2024-12-17 NOTE — H&P PST ADULT - NSICDXPASTMEDICALHX_GEN_ALL_CORE_FT
PAST MEDICAL HISTORY:  History of ovarian cyst left     PAST MEDICAL HISTORY:  History of ovarian cyst left    Incompetent cervix

## 2024-12-17 NOTE — H&P PST ADULT - PSYCHIATRIC
negative normal/normal affect/alert and oriented x3/normal behavior normal/normal affect/alert and oriented x3

## 2024-12-19 ENCOUNTER — ASOB RESULT (OUTPATIENT)
Age: 29
End: 2024-12-19

## 2024-12-19 ENCOUNTER — APPOINTMENT (OUTPATIENT)
Dept: ANTEPARTUM | Facility: CLINIC | Age: 29
End: 2024-12-19

## 2024-12-19 PROBLEM — N88.3 INCOMPETENCE OF CERVIX UTERI: Chronic | Status: ACTIVE | Noted: 2024-12-17

## 2024-12-19 PROCEDURE — 76805 OB US >/= 14 WKS SNGL FETUS: CPT

## 2024-12-19 PROCEDURE — 76817 TRANSVAGINAL US OBSTETRIC: CPT | Mod: 59

## 2024-12-19 NOTE — ASU PATIENT PROFILE, ADULT - DATE AND TIME PROVIDER WAS NOTIFIED
[address], [address]  [place] [address]  Phone: [phone]  Fax: [phone]      Date of Visit:  10/25/24  Patient Name: Kusum Linda   Patient :  1944     ASSESSMENT/PLAN     1. Weakness of both lower extremities  -     [facility]  -     CBC with Auto Differential; Future  2. Frequent falls  -     [facility]  3. Type 2 diabetes mellitus without complication, with long-term current use of insulin ([facility])     Significant weakness and fall risk as of late.  No particular inciting event, just decreased physical condition and activity level over the last several months.  Multiple falls over the last months, no serious injuries.  Have referred for urgent physical therapy.  Will order CBC, CMP, TSH for further workup.  Discussed with daughter to call the office early next week to update on progress.    Diabetes also discussed, patient does have significant hyperglycemic episodes throughout the day, however on review of her CGM her fasting sugars are well-controlled.  This would indicate patient is not appropriately using her short-term insulin, daughter concurs.  Patient is alone during the day, and memory does not allow her to appropriately give herself the short-term insulin.  Did discuss with family higher level of care may be appropriate for patient at [facility], family will discuss.    Follow up in 2 weeks.    - Questions/concerns answered. Patient verbalized and expressed understanding. Medications, laboratory testing, imaging, consultation, and follow up as documented in this encounter.       HPI:     Kusum Linda is a 80 y.o. female with   Patient Active Problem List   Diagnosis    Type 2 diabetes mellitus with stage 3 chronic kidney disease, with long-term current use of insulin ([facility])    Essential hypertension    Obesity, morbid, BMI 40.0-49.9    Chronic diastolic heart failure 
20-Dec-2024 14:48

## 2024-12-20 ENCOUNTER — TRANSCRIPTION ENCOUNTER (OUTPATIENT)
Age: 29
End: 2024-12-20

## 2024-12-20 ENCOUNTER — APPOINTMENT (OUTPATIENT)
Dept: OBGYN | Facility: HOSPITAL | Age: 29
End: 2024-12-20

## 2024-12-20 ENCOUNTER — OUTPATIENT (OUTPATIENT)
Dept: OUTPATIENT SERVICES | Facility: HOSPITAL | Age: 29
LOS: 1 days | Discharge: HOME | End: 2024-12-20
Payer: MEDICAID

## 2024-12-20 VITALS
WEIGHT: 173.94 LBS | SYSTOLIC BLOOD PRESSURE: 117 MMHG | HEART RATE: 81 BPM | HEIGHT: 68 IN | TEMPERATURE: 98 F | OXYGEN SATURATION: 100 % | RESPIRATION RATE: 15 BRPM | DIASTOLIC BLOOD PRESSURE: 72 MMHG

## 2024-12-20 DIAGNOSIS — O09.299 SUPERVISION OF PREGNANCY WITH OTHER POOR REPRODUCTIVE OR OBSTETRIC HISTORY, UNSPECIFIED TRIMESTER: Chronic | ICD-10-CM

## 2024-12-20 DIAGNOSIS — Z98.891 HISTORY OF UTERINE SCAR FROM PREVIOUS SURGERY: Chronic | ICD-10-CM

## 2024-12-20 DIAGNOSIS — O26.872 CERVICAL SHORTENING, SECOND TRIMESTER: ICD-10-CM

## 2024-12-20 PROCEDURE — 59320 REVISION OF CERVIX: CPT | Mod: GC

## 2024-12-20 RX ORDER — ONDANSETRON HYDROCHLORIDE 4 MG/1
4 TABLET, FILM COATED ORAL ONCE
Refills: 0 | Status: DISCONTINUED | OUTPATIENT
Start: 2024-12-20 | End: 2024-12-23

## 2024-12-20 RX ORDER — HYDROMORPHONE HYDROCHLORIDE 2 MG/1
0.5 TABLET ORAL ONCE
Refills: 0 | Status: DISCONTINUED | OUTPATIENT
Start: 2024-12-20 | End: 2024-12-23

## 2024-12-20 RX ORDER — HYDROMORPHONE HYDROCHLORIDE 2 MG/1
0.2 TABLET ORAL ONCE
Refills: 0 | Status: DISCONTINUED | OUTPATIENT
Start: 2024-12-20 | End: 2024-12-23

## 2024-12-20 RX ORDER — .BETA.-CAROTENE, SODIUM ACETATE, ASCORBIC ACID, CHOLECALCIFEROL, .ALPHA.-TOCOPHEROL ACETATE, DL-, THIAMINE MONONITRATE, RIBOFLAVIN, NIACINAMIDE, PYRIDOXINE HYDROCHLORIDE, FOLIC ACID, CYANOCOBALAMIN, CALCIUM CARBONATE, FERROUS FUMARATE, ZINC OXIDE AND CUPRIC OXIDE 2000; 2000; 120; 400; 22; 1.84; 3; 20; 10; 1; 12; 200; 27; 25; 2 [IU]/1; [IU]/1; MG/1; [IU]/1; MG/1; MG/1; MG/1; MG/1; MG/1; MG/1; UG/1; MG/1; MG/1; MG/1; MG/1
0 TABLET ORAL
Refills: 0 | DISCHARGE

## 2024-12-20 RX ORDER — ACETAMINOPHEN 500MG 500 MG/1
650 TABLET, COATED ORAL ONCE
Refills: 0 | Status: COMPLETED | OUTPATIENT
Start: 2024-12-20 | End: 2024-12-20

## 2024-12-20 RX ORDER — INDOMETHACIN 75 MG/1
1 CAPSULE, EXTENDED RELEASE ORAL
Qty: 6 | Refills: 0
Start: 2024-12-20 | End: 2024-12-21

## 2024-12-20 RX ORDER — OXYCODONE HYDROCHLORIDE 30 MG/1
5 TABLET ORAL ONCE
Refills: 0 | Status: ACTIVE | OUTPATIENT
Start: 2024-12-20

## 2024-12-20 RX ORDER — 0.9 % SODIUM CHLORIDE 0.9 %
1000 INTRAVENOUS SOLUTION INTRAVENOUS
Refills: 0 | Status: DISCONTINUED | OUTPATIENT
Start: 2024-12-20 | End: 2024-12-20

## 2024-12-20 RX ORDER — INDOMETHACIN 75 MG/1
50 CAPSULE, EXTENDED RELEASE ORAL ONCE
Refills: 0 | Status: COMPLETED | OUTPATIENT
Start: 2024-12-20 | End: 2024-12-20

## 2024-12-20 RX ADMIN — ACETAMINOPHEN 500MG 650 MILLIGRAM(S): 500 TABLET, COATED ORAL at 23:49

## 2024-12-20 RX ADMIN — INDOMETHACIN 50 MILLIGRAM(S): 75 CAPSULE, EXTENDED RELEASE ORAL at 19:49

## 2024-12-20 RX ADMIN — ACETAMINOPHEN 500MG 650 MILLIGRAM(S): 500 TABLET, COATED ORAL at 23:10

## 2024-12-20 RX ADMIN — INDOMETHACIN 50 MILLIGRAM(S): 75 CAPSULE, EXTENDED RELEASE ORAL at 22:08

## 2024-12-20 NOTE — ASU DISCHARGE PLAN (ADULT/PEDIATRIC) - ASU DC SPECIAL INSTRUCTIONSFT
Postoperative Instructions      Pain control: For pain control, take the following   Cramping will be normal after the cerclage placement.   1. Take Tylenol 975 four times a day  Tylenol can be obtained over the counter.      Postoperative restrictions: Do not drive or make important decisions for 24 hours after anesthesia. You may feel drowsy for 24 hours and should have a responsible adult with you during that time. Nothing in the vagina (tampons, sexual intercourse) for the remainder of the pregnancy. No tub baths, pools for 2 weeks (showers are ok!).     Vaginal bleeding: Spotting from the vagina is normal in first few weeks after surgery. If you are soaking 1 pad per hour, that is not normal and you should notify your doctor's office and seek medical attention right away.       Signs of Infection: Some postoperative pain and discomfort is normal. However, if you experience any of the following, you may be developing an infection and should be seen by your doctor: pain that does not get better with the oral medications listed above, fever greater than 100.4F, foul smelling discharge coming from the surgical site, nausea and vomiting that does not stop (especially if you are unable to tolerate oral intake), or inability to urinate. If you experience any of these symptoms, call your doctor's office to be seen right away. Postoperative Instructions      Pain control: For pain control, take the following   Cramping will be normal after the cerclage placement.   1. Take Tylenol 975 four times a day  2. A prescription for indomethacin 25mg has been sent to your pharmacy. Take this every 8 hours until there are no more tablets left. This will help prevent cramping.   Tylenol can be obtained over the counter.      Postoperative restrictions: Do not drive or make important decisions for 24 hours after anesthesia. You may feel drowsy for 24 hours and should have a responsible adult with you during that time. Nothing in the vagina (tampons, sexual intercourse) for the remainder of the pregnancy. No tub baths, pools for 2 weeks (showers are ok!).     Vaginal bleeding: Spotting from the vagina is normal in first few weeks after surgery. If you are soaking 1 pad per hour, that is not normal and you should notify your doctor's office and seek medical attention right away.       Signs of Infection: Some postoperative pain and discomfort is normal. However, if you experience any of the following, you may be developing an infection and should be seen by your doctor: pain that does not get better with the oral medications listed above, fever greater than 100.4F, foul smelling discharge coming from the surgical site, nausea and vomiting that does not stop (especially if you are unable to tolerate oral intake), or inability to urinate. If you experience any of these symptoms, call your doctor's office to be seen right away.

## 2024-12-20 NOTE — BRIEF OPERATIVE NOTE - NSICDXBRIEFPREOP_GEN_ALL_CORE_FT
PRE-OP DIAGNOSIS:  Cervical insufficiency in pregnancy, antepartum 20-Dec-2024 18:35:57  Deloris Prakash

## 2024-12-20 NOTE — ASU DISCHARGE PLAN (ADULT/PEDIATRIC) - CARE PROVIDER_API CALL
Charline Hobbs  Obstetrics and Gynecology  [address] [place] [address]  Phone: [phone]  Fax: [phone]  Follow Up Time:

## 2024-12-20 NOTE — BRIEF OPERATIVE NOTE - OPERATION/FINDINGS
Cervix visually closed. Prolene Mohan cerclage x2 placed. Both tied with air knot to tag at distal end of knot. Cervix palpated after creclage placement and found to be closed.  post op in OR.

## 2024-12-20 NOTE — ASU DISCHARGE PLAN (ADULT/PEDIATRIC) - FINANCIAL ASSISTANCE
[facility] provides services at a reduced cost to those who are determined to be eligible through [facility]’s financial assistance program. Information regarding [facility]’s financial assistance program can be found by going to [email or web address] or by calling [phone].

## 2024-12-20 NOTE — BRIEF OPERATIVE NOTE - NSICDXBRIEFPOSTOP_GEN_ALL_CORE_FT
POST-OP DIAGNOSIS:  Cervical insufficiency in pregnancy, antepartum 20-Dec-2024 18:36:06  Deloris Prakash

## 2024-12-21 VITALS — OXYGEN SATURATION: 98 % | TEMPERATURE: 98 F | HEART RATE: 77 BPM | RESPIRATION RATE: 19 BRPM

## 2024-12-27 ENCOUNTER — ASOB RESULT (OUTPATIENT)
Age: 29
End: 2024-12-27

## 2024-12-27 ENCOUNTER — APPOINTMENT (OUTPATIENT)
Dept: ANTEPARTUM | Facility: CLINIC | Age: 29
End: 2024-12-27

## 2024-12-27 PROCEDURE — 76815 OB US LIMITED FETUS(S): CPT

## 2024-12-27 PROCEDURE — 76817 TRANSVAGINAL US OBSTETRIC: CPT

## 2024-12-31 ENCOUNTER — NON-APPOINTMENT (OUTPATIENT)
Age: 29
End: 2024-12-31

## 2024-12-31 ENCOUNTER — APPOINTMENT (OUTPATIENT)
Dept: OBGYN | Facility: CLINIC | Age: 29
End: 2024-12-31

## 2024-12-31 VITALS
WEIGHT: 177 LBS | DIASTOLIC BLOOD PRESSURE: 74 MMHG | HEART RATE: 76 BPM | SYSTOLIC BLOOD PRESSURE: 123 MMHG | HEIGHT: 69 IN | BODY MASS INDEX: 26.22 KG/M2

## 2024-12-31 DIAGNOSIS — O26.872 CERVICAL SHORTENING, SECOND TRIMESTER: ICD-10-CM

## 2024-12-31 DIAGNOSIS — Z34.02 ENCOUNTER FOR SUPERVISION OF NORMAL FIRST PREGNANCY, SECOND TRIMESTER: ICD-10-CM

## 2024-12-31 PROCEDURE — 99459 PELVIC EXAMINATION: CPT

## 2024-12-31 PROCEDURE — 99213 OFFICE O/P EST LOW 20 MIN: CPT | Mod: TH

## 2025-01-02 LAB
AFP INTERP SERPL-IMP: NORMAL
AFP INTERP SERPL-IMP: NORMAL
AFP MOM CUT-OFF: 2.8
AFP MOM SERPL: 0.96
AFP PERCENTILE: 45.5
AFP SERPL-ACNC: 42.67 NG/ML
CARBAMAZEPINE?: NO
CURRENT SMOKER: NORMAL
DIABETES STATUS PATIENT: NORMAL
GA: NORMAL
GESTATIONAL AGE METHOD: NORMAL
HX OF NTD NARR: NORMAL
MULTIPLE PREGNANCY: NORMAL
NEURAL TUBE DEFECT RISK FETUS: NORMAL
NEURAL TUBE DEFECT RISK POP: NORMAL
RECOM F/U: NO
TEST PERFORMANCE INFO SPEC: NORMAL
VALPROIC ACID?: NORMAL

## 2025-01-07 NOTE — OB RN PATIENT PROFILE - NSWEIGHTCALCTOOLDRUG_GEN_A_CORE
6 year old male with headache. Very well appearing. Interactive.  Neuro exam normal.  Discussed with dad possible migraine vs. virus vs. hunger  headache trigger.  No red flag s/s.  used

## 2025-01-10 ENCOUNTER — APPOINTMENT (OUTPATIENT)
Dept: ANTEPARTUM | Facility: CLINIC | Age: 30
End: 2025-01-10

## 2025-01-10 ENCOUNTER — ASOB RESULT (OUTPATIENT)
Age: 30
End: 2025-01-10

## 2025-01-10 PROCEDURE — 76811 OB US DETAILED SNGL FETUS: CPT

## 2025-01-10 PROCEDURE — 76817 TRANSVAGINAL US OBSTETRIC: CPT | Mod: 59

## 2025-01-24 ENCOUNTER — ASOB RESULT (OUTPATIENT)
Age: 30
End: 2025-01-24

## 2025-01-24 ENCOUNTER — APPOINTMENT (OUTPATIENT)
Dept: ANTEPARTUM | Facility: CLINIC | Age: 30
End: 2025-01-24

## 2025-01-24 PROCEDURE — 76815 OB US LIMITED FETUS(S): CPT

## 2025-01-24 PROCEDURE — 76817 TRANSVAGINAL US OBSTETRIC: CPT

## 2025-01-30 ENCOUNTER — NON-APPOINTMENT (OUTPATIENT)
Age: 30
End: 2025-01-30

## 2025-01-30 ENCOUNTER — TRANSCRIPTION ENCOUNTER (OUTPATIENT)
Age: 30
End: 2025-01-30

## 2025-01-30 ENCOUNTER — APPOINTMENT (OUTPATIENT)
Dept: OBGYN | Facility: CLINIC | Age: 30
End: 2025-01-30

## 2025-01-30 VITALS
HEIGHT: 69 IN | DIASTOLIC BLOOD PRESSURE: 75 MMHG | HEART RATE: 76 BPM | BODY MASS INDEX: 26.51 KG/M2 | SYSTOLIC BLOOD PRESSURE: 118 MMHG | WEIGHT: 179 LBS

## 2025-01-30 DIAGNOSIS — O26.872 CERVICAL SHORTENING, SECOND TRIMESTER: ICD-10-CM

## 2025-01-30 DIAGNOSIS — Z34.82 ENCOUNTER FOR SUPERVISION OF OTHER NORMAL PREGNANCY, SECOND TRIMESTER: ICD-10-CM

## 2025-01-30 DIAGNOSIS — Z78.9 OTHER SPECIFIED HEALTH STATUS: ICD-10-CM

## 2025-01-30 PROCEDURE — 99459 PELVIC EXAMINATION: CPT

## 2025-01-30 PROCEDURE — 99213 OFFICE O/P EST LOW 20 MIN: CPT | Mod: TH

## 2025-02-06 ENCOUNTER — TRANSCRIPTION ENCOUNTER (OUTPATIENT)
Age: 30
End: 2025-02-06

## 2025-02-10 ENCOUNTER — ASOB RESULT (OUTPATIENT)
Age: 30
End: 2025-02-10

## 2025-02-10 ENCOUNTER — APPOINTMENT (OUTPATIENT)
Dept: ANTEPARTUM | Facility: CLINIC | Age: 30
End: 2025-02-10
Payer: MEDICAID

## 2025-02-10 ENCOUNTER — OUTPATIENT (OUTPATIENT)
Dept: INPATIENT UNIT | Facility: HOSPITAL | Age: 30
LOS: 1 days | Discharge: HOME | End: 2025-02-10
Payer: MEDICAID

## 2025-02-10 VITALS
RESPIRATION RATE: 16 BRPM | TEMPERATURE: 98 F | HEART RATE: 85 BPM | DIASTOLIC BLOOD PRESSURE: 64 MMHG | SYSTOLIC BLOOD PRESSURE: 113 MMHG

## 2025-02-10 VITALS — DIASTOLIC BLOOD PRESSURE: 60 MMHG | SYSTOLIC BLOOD PRESSURE: 106 MMHG | HEART RATE: 82 BPM

## 2025-02-10 DIAGNOSIS — O26.899 OTHER SPECIFIED PREGNANCY RELATED CONDITIONS, UNSPECIFIED TRIMESTER: ICD-10-CM

## 2025-02-10 DIAGNOSIS — Z98.891 HISTORY OF UTERINE SCAR FROM PREVIOUS SURGERY: Chronic | ICD-10-CM

## 2025-02-10 PROCEDURE — 76815 OB US LIMITED FETUS(S): CPT | Mod: 26

## 2025-02-10 PROCEDURE — 99221 1ST HOSP IP/OBS SF/LOW 40: CPT

## 2025-02-10 RX ORDER — PNV WITH CALCIUM NO.11/IRON/FA 65 MG-1 MG
1 TABLET ORAL
Refills: 0 | DISCHARGE

## 2025-02-10 RX ORDER — SODIUM CHLORIDE 9 G/ML
1000 INJECTION, SOLUTION INTRAVENOUS ONCE
Refills: 0 | Status: DISCONTINUED | OUTPATIENT
Start: 2025-02-10 | End: 2025-02-24

## 2025-02-10 NOTE — OB PROVIDER TRIAGE NOTE - NSOBPROVIDERNOTE_OBGYN_ALL_OB_FT
Plan D/W Dr. Giron, no evidence of acute process at this time. Normal fetal testing.   No evidence of vaginal bleeding.   Follow up in office in 1 week  Call MD if symptoms continue

## 2025-02-10 NOTE — OB RN TRIAGE NOTE - SUICIDE SCREENING QUESTION 1
Chief Complaint   Patient presents with    Rash     Spot on private area      Patient presents today with mother. Patient complains of a sore spot at the end of his penis. Mother reports that it has been present for about a week. Mother reports that she has been concerned with patient circumcision since his birth, reports the patient seems to have frequent irritation. Subjective: (As above and below)     Chief Complaint   Patient presents with    Rash     Spot on private area      he is a 12y.o. year old male who presents for evaluation. Reviewed PmHx, RxHx, FmHx, SocHx, AllgHx and updated in chart. Review of Systems - negative except as listed above    Objective:     Vitals:    08/17/21 0811   BP: 105/69   Pulse: 89   Resp: 18   Temp: 98.8 °F (37.1 °C)   TempSrc: Oral   SpO2: 99%   Weight: 45 lb 9.6 oz (20.7 kg)   Height: (!) 3' 10.2\" (1.173 m)     Physical Examination: General appearance - alert, well appearing, and in no distress  Mental status - normal mood, behavior, speech, dress, motor activity, and thought processes  Chest - clear to auscultation, no wheezes, rales or rhonchi, symmetric air entry  Heart - normal rate, regular rhythm, normal S1, S2, no murmurs, rubs, clicks or gallops  Abdomen - soft, nontender, nondistended, no masses or organomegaly   - pt has an area of inflammation by skin adhesion at distal anterior tip of penis, several partial skin adhesions noted  Musculoskeletal - no joint tenderness, deformity or swelling  Extremities - peripheral pulses normal, no pedal edema, no clubbing or cyanosis    Assessment/ Plan:   1. Phimosis  -use cream as written, follow up with ped urology  - betamethasone dipropionate (DIPROLENE) 0.05 % ointment; Apply  to affected area two (2) times a day.   Dispense: 15 g; Refill: 0  - REFERRAL TO PEDIATRIC UROLOGY    2. Klinefelter's syndrome  - REFERRAL TO PEDIATRIC UROLOGY       I have discussed the diagnosis with the patient and the intended plan as seen in the above orders. The patient has received an after-visit summary and questions were answered concerning future plans. Medication Side Effects and Warnings were discussed with patient: yes  Patient Labs were reviewed: yes  Patient Past Records were reviewed:   Yes      Reymundo Raymond M.D. No

## 2025-02-10 NOTE — OB PROVIDER TRIAGE NOTE - HISTORY OF PRESENT ILLNESS
28yo  @ 24.4 presents for evaluation for bleeding. Patient reports red blood on tissue after having BM last night and this morning. Reports she thinks its coming from her behind but wants to make sure because of her history. Reports she does have H/O hemorrhoids. Denies pain, LOF and reports GFM.   AP course complicated by prophylactic cerclage insitu  H/O 23.6 week PPROM with 25.2 week delivery     C/S- reports low transverse incision on uterus

## 2025-02-10 NOTE — OB RN TRIAGE NOTE - FALL HARM RISK - UNIVERSAL INTERVENTIONS
74 Bed in lowest position, wheels locked, appropriate side rails in place/Call bell, personal items and telephone in reach/Instruct patient to call for assistance before getting out of bed or chair/Non-slip footwear when patient is out of bed/[place] to call system/Physically safe environment - no spills, clutter or unnecessary equipment/Purposeful Proactive Rounding/Room/bathroom lighting operational, light cord in reach

## 2025-02-10 NOTE — OB PROVIDER TRIAGE NOTE - NS_OBGYNHISTORY_OBGYN_ALL_OB_FT
sab complete  12/1/2022 c/s @25.2wk - PPROM at 23.6     AP course complicated by:  Prophylactic cerclage insitu, CL 4.4 on 1/24    Blood type O pos

## 2025-02-10 NOTE — OB PROVIDER TRIAGE NOTE - NSHPPHYSICALEXAM_GEN_ALL_CORE
Assessment reveals VSS, abdomen soft, NT.   Limited US performed- vtx, fundal placenta, MVP 5-saved in asob  SSE- cervix appears closed. No blood noted at all in vault  No hemorrhoids noted.   Initail EFM with minimal to mod variability no accels no decels. No ctx on toco. Awaiting NST    1510- Prolonged deceleration noted.   LR 1000cc bolus ordered Assessment reveals VSS, abdomen soft, NT.   Limited US performed- vtx, fundal placenta, MVP 5-saved in asob  SSE- cervix appears closed. No blood noted at all in vault. Stitch not visualized  No hemorrhoids noted.   Initail EFM with minimal to mod variability no accels no decels. No ctx on toco. Awaiting NST    1510- Prolonged deceleration noted.   LR 1000cc bolus ordered-patient refused at this time    1530: Dr. Hobbs in to see patient.     1610: Cat 1 FHT, no ctx on toco 1 hour following decel

## 2025-02-12 DIAGNOSIS — O46.92 ANTEPARTUM HEMORRHAGE, UNSPECIFIED, SECOND TRIMESTER: ICD-10-CM

## 2025-02-12 DIAGNOSIS — Z3A.24 24 WEEKS GESTATION OF PREGNANCY: ICD-10-CM

## 2025-02-12 DIAGNOSIS — O34.211 MATERNAL CARE FOR LOW TRANSVERSE SCAR FROM PREVIOUS CESAREAN DELIVERY: ICD-10-CM

## 2025-02-12 DIAGNOSIS — O09.292 SUPERVISION OF PREGNANCY WITH OTHER POOR REPRODUCTIVE OR OBSTETRIC HISTORY, SECOND TRIMESTER: ICD-10-CM

## 2025-02-12 DIAGNOSIS — O09.213 SUPERVISION OF PREGNANCY WITH HISTORY OF PRE-TERM LABOR, THIRD TRIMESTER: ICD-10-CM

## 2025-02-12 DIAGNOSIS — O34.82 MATERNAL CARE FOR OTHER ABNORMALITIES OF PELVIC ORGANS, SECOND TRIMESTER: ICD-10-CM

## 2025-02-12 DIAGNOSIS — O34.32 MATERNAL CARE FOR CERVICAL INCOMPETENCE, SECOND TRIMESTER: ICD-10-CM

## 2025-02-12 DIAGNOSIS — N83.202 UNSPECIFIED OVARIAN CYST, LEFT SIDE: ICD-10-CM

## 2025-02-13 ENCOUNTER — TRANSCRIPTION ENCOUNTER (OUTPATIENT)
Age: 30
End: 2025-02-13

## 2025-02-18 ENCOUNTER — APPOINTMENT (OUTPATIENT)
Dept: OBGYN | Facility: CLINIC | Age: 30
End: 2025-02-18

## 2025-02-20 ENCOUNTER — TRANSCRIPTION ENCOUNTER (OUTPATIENT)
Age: 30
End: 2025-02-20

## 2025-02-27 ENCOUNTER — TRANSCRIPTION ENCOUNTER (OUTPATIENT)
Age: 30
End: 2025-02-27

## 2025-03-04 ENCOUNTER — NON-APPOINTMENT (OUTPATIENT)
Age: 30
End: 2025-03-04

## 2025-03-04 ENCOUNTER — APPOINTMENT (OUTPATIENT)
Dept: OBGYN | Facility: CLINIC | Age: 30
End: 2025-03-04

## 2025-03-04 VITALS
SYSTOLIC BLOOD PRESSURE: 125 MMHG | DIASTOLIC BLOOD PRESSURE: 76 MMHG | HEART RATE: 90 BPM | HEIGHT: 69 IN | WEIGHT: 182 LBS | BODY MASS INDEX: 26.96 KG/M2

## 2025-03-04 DIAGNOSIS — Z34.82 ENCOUNTER FOR SUPERVISION OF OTHER NORMAL PREGNANCY, SECOND TRIMESTER: ICD-10-CM

## 2025-03-04 DIAGNOSIS — N89.8 OTHER SPECIFIED NONINFLAMMATORY DISORDERS OF VAGINA: ICD-10-CM

## 2025-03-04 PROCEDURE — 99213 OFFICE O/P EST LOW 20 MIN: CPT | Mod: 25

## 2025-03-04 RX ORDER — TERCONAZOLE 8 MG/G
0.8 CREAM VAGINAL
Qty: 1 | Refills: 0 | Status: ACTIVE | COMMUNITY
Start: 2025-03-04 | End: 1900-01-01

## 2025-03-11 ENCOUNTER — TRANSCRIPTION ENCOUNTER (OUTPATIENT)
Age: 30
End: 2025-03-11

## 2025-03-12 ENCOUNTER — ASOB RESULT (OUTPATIENT)
Age: 30
End: 2025-03-12

## 2025-03-12 ENCOUNTER — APPOINTMENT (OUTPATIENT)
Dept: ANTEPARTUM | Facility: CLINIC | Age: 30
End: 2025-03-12
Payer: MEDICAID

## 2025-03-12 LAB
GLUCOSE 1H P 50 G GLC PO SERPL-MCNC: 101 MG/DL
HCT VFR BLD CALC: 31 %
HGB BLD-MCNC: 10 G/DL
MCHC RBC-ENTMCNC: 27 PG
MCHC RBC-ENTMCNC: 32.3 G/DL
MCV RBC AUTO: 83.6 FL
PLATELET # BLD AUTO: 289 K/UL
RBC # BLD: 3.71 M/UL
RBC # FLD: 13.7 %
T PALLIDUM AB SER QL IA: NEGATIVE
WBC # FLD AUTO: 6.94 K/UL

## 2025-03-12 PROCEDURE — 76819 FETAL BIOPHYS PROFIL W/O NST: CPT | Mod: 59

## 2025-03-12 PROCEDURE — 76816 OB US FOLLOW-UP PER FETUS: CPT

## 2025-03-17 ENCOUNTER — NON-APPOINTMENT (OUTPATIENT)
Age: 30
End: 2025-03-17

## 2025-03-25 ENCOUNTER — APPOINTMENT (OUTPATIENT)
Dept: OBGYN | Facility: CLINIC | Age: 30
End: 2025-03-25

## 2025-03-25 VITALS
DIASTOLIC BLOOD PRESSURE: 78 MMHG | SYSTOLIC BLOOD PRESSURE: 125 MMHG | HEIGHT: 69 IN | WEIGHT: 184 LBS | BODY MASS INDEX: 27.25 KG/M2 | HEART RATE: 83 BPM

## 2025-03-25 DIAGNOSIS — Z34.83 ENCOUNTER FOR SUPERVISION OF OTHER NORMAL PREGNANCY, THIRD TRIMESTER: ICD-10-CM

## 2025-03-25 PROCEDURE — 99213 OFFICE O/P EST LOW 20 MIN: CPT | Mod: TH

## 2025-03-25 PROCEDURE — 99459 PELVIC EXAMINATION: CPT

## 2025-04-10 ENCOUNTER — APPOINTMENT (OUTPATIENT)
Dept: OBGYN | Facility: CLINIC | Age: 30
End: 2025-04-10

## 2025-04-10 ENCOUNTER — NON-APPOINTMENT (OUTPATIENT)
Age: 30
End: 2025-04-10

## 2025-04-10 VITALS
BODY MASS INDEX: 27.4 KG/M2 | SYSTOLIC BLOOD PRESSURE: 121 MMHG | HEART RATE: 85 BPM | HEIGHT: 69 IN | WEIGHT: 185 LBS | DIASTOLIC BLOOD PRESSURE: 74 MMHG

## 2025-04-10 DIAGNOSIS — Z34.83 ENCOUNTER FOR SUPERVISION OF OTHER NORMAL PREGNANCY, THIRD TRIMESTER: ICD-10-CM

## 2025-04-10 PROCEDURE — 99213 OFFICE O/P EST LOW 20 MIN: CPT | Mod: TH,25

## 2025-04-10 PROCEDURE — 90715 TDAP VACCINE 7 YRS/> IM: CPT

## 2025-04-10 PROCEDURE — 99459 PELVIC EXAMINATION: CPT

## 2025-04-10 PROCEDURE — 90471 IMMUNIZATION ADMIN: CPT

## 2025-04-24 DIAGNOSIS — F32.A ANXIETY DISORDER, UNSPECIFIED: ICD-10-CM

## 2025-04-24 DIAGNOSIS — F41.9 ANXIETY DISORDER, UNSPECIFIED: ICD-10-CM

## 2025-04-29 ENCOUNTER — OUTPATIENT (OUTPATIENT)
Dept: OUTPATIENT SERVICES | Facility: HOSPITAL | Age: 30
LOS: 1 days | End: 2025-04-29

## 2025-04-29 VITALS
WEIGHT: 188.94 LBS | TEMPERATURE: 97 F | RESPIRATION RATE: 16 BRPM | HEIGHT: 67 IN | SYSTOLIC BLOOD PRESSURE: 121 MMHG | DIASTOLIC BLOOD PRESSURE: 76 MMHG | OXYGEN SATURATION: 97 % | HEART RATE: 83 BPM

## 2025-04-29 DIAGNOSIS — Z98.891 HISTORY OF UTERINE SCAR FROM PREVIOUS SURGERY: Chronic | ICD-10-CM

## 2025-04-29 DIAGNOSIS — O09.299 SUPERVISION OF PREGNANCY WITH OTHER POOR REPRODUCTIVE OR OBSTETRIC HISTORY, UNSPECIFIED TRIMESTER: Chronic | ICD-10-CM

## 2025-04-29 DIAGNOSIS — Z98.891 HISTORY OF UTERINE SCAR FROM PREVIOUS SURGERY: ICD-10-CM

## 2025-04-29 LAB
ALBUMIN SERPL ELPH-MCNC: 3.6 G/DL — SIGNIFICANT CHANGE UP (ref 3.3–5)
ALP SERPL-CCNC: 134 U/L — HIGH (ref 40–120)
ALT FLD-CCNC: 10 U/L — SIGNIFICANT CHANGE UP (ref 10–40)
ANION GAP SERPL CALC-SCNC: 16 MMOL/L — SIGNIFICANT CHANGE UP (ref 5–17)
APPEARANCE UR: CLEAR — SIGNIFICANT CHANGE UP
AST SERPL-CCNC: 13 U/L — SIGNIFICANT CHANGE UP (ref 10–35)
BACTERIA # UR AUTO: NEGATIVE /HPF — SIGNIFICANT CHANGE UP
BASOPHILS # BLD AUTO: 0.03 K/UL — SIGNIFICANT CHANGE UP (ref 0–0.2)
BASOPHILS NFR BLD AUTO: 0.5 % — SIGNIFICANT CHANGE UP (ref 0–2)
BILIRUB SERPL-MCNC: 0.2 MG/DL — SIGNIFICANT CHANGE UP (ref 0.2–1.2)
BILIRUB UR-MCNC: NEGATIVE — SIGNIFICANT CHANGE UP
BLD GP AB SCN SERPL QL: POSITIVE — SIGNIFICANT CHANGE UP
BUN SERPL-MCNC: 12 MG/DL — SIGNIFICANT CHANGE UP (ref 7–23)
CALCIUM SERPL-MCNC: 9.3 MG/DL — SIGNIFICANT CHANGE UP (ref 8.4–10.5)
CAST: 0 /LPF — SIGNIFICANT CHANGE UP (ref 0–4)
CHLORIDE SERPL-SCNC: 102 MMOL/L — SIGNIFICANT CHANGE UP (ref 96–108)
CO2 SERPL-SCNC: 19 MMOL/L — LOW (ref 22–31)
COLOR SPEC: YELLOW — SIGNIFICANT CHANGE UP
CREAT SERPL-MCNC: 0.7 MG/DL — SIGNIFICANT CHANGE UP (ref 0.5–1.3)
CRP SERPL-MCNC: <3 MG/L — SIGNIFICANT CHANGE UP
DIFF PNL FLD: NEGATIVE — SIGNIFICANT CHANGE UP
EGFR: 121 ML/MIN/1.73M2 — SIGNIFICANT CHANGE UP
EGFR: 121 ML/MIN/1.73M2 — SIGNIFICANT CHANGE UP
EOSINOPHIL # BLD AUTO: 0.06 K/UL — SIGNIFICANT CHANGE UP (ref 0–0.5)
EOSINOPHIL NFR BLD AUTO: 1.1 % — SIGNIFICANT CHANGE UP (ref 0–6)
FERRITIN SERPL-MCNC: 11 NG/ML — LOW (ref 15–150)
FOLATE SERPL-MCNC: >20 NG/ML — SIGNIFICANT CHANGE UP
GLUCOSE SERPL-MCNC: 76 MG/DL — SIGNIFICANT CHANGE UP (ref 70–99)
GLUCOSE UR QL: NEGATIVE MG/DL — SIGNIFICANT CHANGE UP
HCT VFR BLD CALC: 30 % — LOW (ref 34.5–45)
HGB BLD-MCNC: 9.4 G/DL — LOW (ref 11.5–15.5)
IMM GRANULOCYTES NFR BLD AUTO: 0.9 % — SIGNIFICANT CHANGE UP (ref 0–0.9)
IRON SATN MFR SERPL: 14 % — SIGNIFICANT CHANGE UP (ref 14–50)
IRON SATN MFR SERPL: 70 UG/DL — SIGNIFICANT CHANGE UP (ref 30–160)
KETONES UR-MCNC: NEGATIVE MG/DL — SIGNIFICANT CHANGE UP
LEUKOCYTE ESTERASE UR-ACNC: ABNORMAL
LYMPHOCYTES # BLD AUTO: 1.47 K/UL — SIGNIFICANT CHANGE UP (ref 1–3.3)
LYMPHOCYTES # BLD AUTO: 26.8 % — SIGNIFICANT CHANGE UP (ref 13–44)
MCHC RBC-ENTMCNC: 25.8 PG — LOW (ref 27–34)
MCHC RBC-ENTMCNC: 31.3 G/DL — LOW (ref 32–36)
MCV RBC AUTO: 82.2 FL — SIGNIFICANT CHANGE UP (ref 80–100)
MONOCYTES # BLD AUTO: 0.37 K/UL — SIGNIFICANT CHANGE UP (ref 0–0.9)
MONOCYTES NFR BLD AUTO: 6.7 % — SIGNIFICANT CHANGE UP (ref 2–14)
NEUTROPHILS # BLD AUTO: 3.51 K/UL — SIGNIFICANT CHANGE UP (ref 1.8–7.4)
NEUTROPHILS NFR BLD AUTO: 64 % — SIGNIFICANT CHANGE UP (ref 43–77)
NITRITE UR-MCNC: NEGATIVE — SIGNIFICANT CHANGE UP
PH UR: 6.5 — SIGNIFICANT CHANGE UP (ref 5–8)
PLATELET # BLD AUTO: 257 K/UL — SIGNIFICANT CHANGE UP (ref 150–400)
POTASSIUM SERPL-MCNC: 4.3 MMOL/L — SIGNIFICANT CHANGE UP (ref 3.5–5.3)
POTASSIUM SERPL-SCNC: 4.3 MMOL/L — SIGNIFICANT CHANGE UP (ref 3.5–5.3)
PROT SERPL-MCNC: 6.4 G/DL — SIGNIFICANT CHANGE UP (ref 6–8.3)
PROT UR-MCNC: NEGATIVE MG/DL — SIGNIFICANT CHANGE UP
RBC # BLD: 3.65 M/UL — LOW (ref 3.8–5.2)
RBC # FLD: 14.2 % — SIGNIFICANT CHANGE UP (ref 10.3–14.5)
RBC CASTS # UR COMP ASSIST: 0 /HPF — SIGNIFICANT CHANGE UP (ref 0–4)
RETICS #: 56.1 K/UL — SIGNIFICANT CHANGE UP (ref 25–125)
RETICS/RBC NFR: 1.6 % — SIGNIFICANT CHANGE UP (ref 0.5–2.5)
RH IG SCN BLD-IMP: POSITIVE — SIGNIFICANT CHANGE UP
SODIUM SERPL-SCNC: 137 MMOL/L — SIGNIFICANT CHANGE UP (ref 135–145)
SP GR SPEC: 1.02 — SIGNIFICANT CHANGE UP (ref 1–1.03)
SQUAMOUS # UR AUTO: 2 /HPF — SIGNIFICANT CHANGE UP (ref 0–5)
TIBC SERPL-MCNC: 503 UG/DL — HIGH (ref 220–430)
UIBC SERPL-MCNC: 432 UG/DL — HIGH (ref 110–370)
UROBILINOGEN FLD QL: 0.2 MG/DL — SIGNIFICANT CHANGE UP (ref 0.2–1)
VIT B12 SERPL-MCNC: 488 PG/ML — SIGNIFICANT CHANGE UP (ref 232–1245)
WBC # BLD: 5.49 K/UL — SIGNIFICANT CHANGE UP (ref 3.8–10.5)
WBC # FLD AUTO: 5.49 K/UL — SIGNIFICANT CHANGE UP (ref 3.8–10.5)
WBC UR QL: 0 /HPF — SIGNIFICANT CHANGE UP (ref 0–5)

## 2025-04-29 NOTE — OB PST NOTE - PROBLEM SELECTOR PLAN 1
Patient is scheduled for repeat Caesarean section, cerclage removal on 5/12/2025. Pre-op instructions provided. Pt given verbal and written instructions with teach back on chlorhexidine shampoo, Gatorade, and anesthesia. Pt verbalized understanding with return demonstration.     CBC, T/S and UA done at PST.  Pt advised  to follow OB for all medication instruction.

## 2025-04-29 NOTE — OB PST NOTE - HISTORY OF PRESENT ILLNESS
29 year old pregnant female presents to presurgical testing scheduled for repeat Caesarean section, cerclage removal on 5/12/2025. Patient denies vaginal bleeding, spotting or leakage of amniotic fluid. Patient denies regular contractions. Patient reports positive fetal movement.

## 2025-04-29 NOTE — OB PST NOTE - NSHPREVIEWOFSYSTEMS_GEN_ALL_CORE
General: no fever, chills, sweating, anorexia, or weight loss    Skin: no rashes, itching, or dryness    Breast: no tenderness, lumps, or nipple discharge    Opthalmologic: no diplopia, photophobia, or blurred vision. uses glasses for myopia.    ENMT: no hearing difficulty, ear pain, tinnitus, or vertigo. No sinus symptoms, nasal congestion, nasal discharge, or nasal obstruction    Respiratory and Thorax: no wheezing, dyspnea, or cough    Cardiovascular: no chest pain, palpitations, dyspnea on exertion, orthopnea, or peripheral edema    Gastrointestinal: no nausea, vomiting, diarrhea, constipation, change in bowel movements, or abdominal pain    Genitourinary and Pelvis: no hematuria, renal colic, flank pain, dysuria, nocturia. No abnormal vaginal bleeding, vaginal discharge, spotting, pelvic pain, or vaginal leakage    Musculoskeletal: no arthralgia, arthritis, joint swelling, muscle cramping, muscle weakness, neck pain, arm pain, or leg pain    Neurological: no transient paralysis, weakness, paresthesias, or seizures. No syncope, tremors, vertigo, loss of sensation, difficulty walking, loss of consciousness    Psychiatric: no suicidal ideation, Hx depression, anxiety- stable on medications.     Hematology: no nose bleeding, easy bruising or bleeding, or skin lumps    Lymphatic: no enlarged or tender lymph nodes, or extremity swelling    Endocrine: no heat or cold intolerance    Immunologic: no recurrent or persistent infections

## 2025-04-29 NOTE — OB PST NOTE - NSICDXPASTMEDICALHX_GEN_ALL_CORE_FT
PAST MEDICAL HISTORY:  Anxiety and depression     History of ovarian cyst left    History of uterine scar from previous surgery     Incompetent cervix

## 2025-05-01 ENCOUNTER — NON-APPOINTMENT (OUTPATIENT)
Age: 30
End: 2025-05-01

## 2025-05-01 ENCOUNTER — APPOINTMENT (OUTPATIENT)
Dept: OBGYN | Facility: CLINIC | Age: 30
End: 2025-05-01

## 2025-05-01 VITALS
WEIGHT: 189 LBS | DIASTOLIC BLOOD PRESSURE: 80 MMHG | HEIGHT: 69 IN | HEART RATE: 75 BPM | BODY MASS INDEX: 27.99 KG/M2 | SYSTOLIC BLOOD PRESSURE: 123 MMHG

## 2025-05-01 DIAGNOSIS — Z34.83 ENCOUNTER FOR SUPERVISION OF OTHER NORMAL PREGNANCY, THIRD TRIMESTER: ICD-10-CM

## 2025-05-01 PROCEDURE — 99213 OFFICE O/P EST LOW 20 MIN: CPT | Mod: TH

## 2025-05-01 PROCEDURE — 99459 PELVIC EXAMINATION: CPT

## 2025-05-09 ENCOUNTER — APPOINTMENT (OUTPATIENT)
Dept: ANTEPARTUM | Facility: CLINIC | Age: 30
End: 2025-05-09

## 2025-05-09 PROCEDURE — 76819 FETAL BIOPHYS PROFIL W/O NST: CPT | Mod: 26

## 2025-05-09 PROCEDURE — 76815 OB US LIMITED FETUS(S): CPT | Mod: 26

## 2025-05-10 LAB
GP B STREP DNA SPEC QL NAA+PROBE: DETECTED
HIV1+2 AB SPEC QL IA.RAPID: NONREACTIVE
SOURCE GBS: NORMAL
T PALLIDUM AB SER QL IA: NEGATIVE

## 2025-05-12 ENCOUNTER — TRANSCRIPTION ENCOUNTER (OUTPATIENT)
Age: 30
End: 2025-05-12

## 2025-05-12 ENCOUNTER — APPOINTMENT (OUTPATIENT)
Dept: OBGYN | Facility: HOSPITAL | Age: 30
End: 2025-05-12

## 2025-05-12 RX ORDER — SERTRALINE 100 MG/1
1 TABLET, FILM COATED ORAL
Refills: 0 | DISCHARGE

## 2025-05-12 RX ORDER — BUSPIRONE HYDROCHLORIDE 15 MG/1
1 TABLET ORAL
Refills: 0 | DISCHARGE

## 2025-05-13 PROBLEM — Z98.891 HISTORY OF UTERINE SCAR FROM PREVIOUS SURGERY: Chronic | Status: INACTIVE | Noted: 2025-04-29 | Resolved: 2025-05-12

## 2025-05-20 NOTE — CONSULT NOTE ADULT - ASSESSMENT
0916  Received Choice form at 0850  Agency/Facility Name: [facility]  Referral sent per Choice form @ 0916     1809  DPA sent referral to [facility] SNF's, per LMSW Delicia.        Assessment: 27 year old  at 23w3d with short cervix, cervical insufficiency presenting with funneling and further shortening in clinic following exam indicated cerclage, found to be 1cm dilated in triage. Patient is comfortable without regular contractions on toco. Fetus is vertex presenting with tracing appropriate for gestational age.     Recommendations:   -Would NOT offer cerclage revision due to periviability, risk of PPROM and unclear benefit in literature.   -Discussed with patient that she is at risk of  delivery; although at this time delivery timing unknown. ***   -GBS collected  -Continue vaginal progesterone  -GC/CT/trich testing     MILAN Rai Fellow  D/w Dr. Del Valle  Assessment: 27 year old  at 23w3d with short cervix, cervical insufficiency presenting with funneling and further shortening in clinic following exam indicated cerclage, found to be 1cm dilated in triage. Patient is comfortable without regular contractions on toco. Fetus is vertex presenting with tracing appropriate for gestational age.     Recommendations:   -Would NOT offer cerclage revision due to periviability, risk of PPROM and unclear benefit in literature.   -Discussed with patient that she is at risk of  delivery; although at this time we cannot predict when she will deliver.***   -GBS collected. GC/CT/trich testing   -Continue vaginal progesterone  -Robitussin for cough symptoms, COVID/ flu testing     MILAN Rai Fellow  D/w Dr. Del Valle  Assessment: 27 year old  at 23w3d with short cervix, cervical insufficiency presenting with funneling and further shortening in clinic following exam-indicated cerclage, found to be 1cm dilated in triage. Patient is comfortable without regular contractions on toco or other symptoms of labor. Fetus is vertex presenting with tracing appropriate for gestational age.     Recommendations:   -Would NOT offer cerclage revision due to periviability, risk of PPROM and unclear benefit in literature.   -We extensively discussed with patient that she is at risk of  delivery; although at this time we cannot predict when she will deliver. We discussed morbidity and mortality of periviable delivery. Offered betamethasone for fetal lung maturity and admission for observation. Patient and her partner spoke and after counseling and answering questions, ***   -GBS collected. GC/CT/trich testing   -Continue vaginal progesterone  -Robitussin for cough symptoms, COVID/ flu testing     MILAN Rai Fellow  D/w Dr. Del Valle  Assessment: 27 year old  at 23w3d with short cervix, cervical insufficiency presenting with funneling and further shortening in clinic following exam-indicated cerclage, found to be 1cm dilated in triage. Patient is comfortable without regular contractions on toco or other symptoms of labor, with no signs/symptoms of intra-amniotic infection at this time. Fetus is vertex presenting with tracing appropriate for gestational age.     Recommendations:   -Would NOT offer cerclage revision due to marques-viability, risk of PPROM and unclear benefit in literature.   -We extensively discussed with patient that she is at risk of  delivery; although at this time we cannot predict when she will deliver. We discussed morbidity and mortality of marques-viable delivery. We discussed the choice of  resuscitation at less than 24 weeks. Offered betamethasone for fetal lung maturity and admission for observation. Patient and her partner spoke and after their questions were answered, they decline admission and betamethasone. Patient is aware she is at risk of further cervical dilation and  delivery, but would prefer inpatient observation when she begins to feel any symptoms of labor. Patient agrees to return for any sign or symptom of labor, and  labor precautions were reviewed.   -No indication for strict bed rest but patient advised to continue pelvic rest.   -GBS collected. GC/CT/trich testing   -Continue vaginal progesterone nightly   -Robitussin for cough symptoms     MILAN Rai Fellow  Patient seen and d/w Dr. Del Valle   Plan d/w Dr. Hobbs

## (undated) DEVICE — ELCTR BOVIE PENCIL BLADE 10FT

## (undated) DEVICE — PACK D&C

## (undated) DEVICE — DRSG TELFA 3 X 8

## (undated) DEVICE — LABELS BLANK W PEN

## (undated) DEVICE — SOL IRR POUR H2O 500ML

## (undated) DEVICE — DRAPE LIGHT HANDLE COVER (GREEN)

## (undated) DEVICE — DRSG PAD SANITARY OB

## (undated) DEVICE — BASIN SET DOUBLE

## (undated) DEVICE — GOWN LG

## (undated) DEVICE — SUT PROLENE 1 30" CT-1

## (undated) DEVICE — TUBING SUCTION NONCONDUCTIVE 6MM X 12FT

## (undated) DEVICE — VISITEC 4X4

## (undated) DEVICE — POSITIONER STRAP ARMBOARD VELCRO TS-30

## (undated) DEVICE — GLV 7.5 PROTEXIS (WHITE)

## (undated) DEVICE — VENODYNE/SCD SLEEVE CALF MEDIUM

## (undated) DEVICE — DRAPE TOWEL BLUE 17" X 24"